# Patient Record
Sex: FEMALE | Race: WHITE | NOT HISPANIC OR LATINO | URBAN - METROPOLITAN AREA
[De-identification: names, ages, dates, MRNs, and addresses within clinical notes are randomized per-mention and may not be internally consistent; named-entity substitution may affect disease eponyms.]

---

## 2022-06-29 ENCOUNTER — APPOINTMENT (OUTPATIENT)
Dept: LAB | Facility: MEDICAL CENTER | Age: 22
End: 2022-06-29

## 2022-06-29 DIAGNOSIS — Z02.1 PRE-EMPLOYMENT HEALTH SCREENING EXAMINATION: ICD-10-CM

## 2022-06-29 LAB — RUBV IGG SERPL IA-ACNC: 9.1 IU/ML

## 2022-06-29 PROCEDURE — 86480 TB TEST CELL IMMUN MEASURE: CPT

## 2022-06-29 PROCEDURE — 36415 COLL VENOUS BLD VENIPUNCTURE: CPT

## 2022-06-29 PROCEDURE — 86787 VARICELLA-ZOSTER ANTIBODY: CPT

## 2022-06-29 PROCEDURE — 86762 RUBELLA ANTIBODY: CPT

## 2022-06-29 PROCEDURE — 86735 MUMPS ANTIBODY: CPT

## 2022-06-29 PROCEDURE — 86765 RUBEOLA ANTIBODY: CPT

## 2022-06-30 LAB
GAMMA INTERFERON BACKGROUND BLD IA-ACNC: 0.04 IU/ML
M TB IFN-G BLD-IMP: NEGATIVE
M TB IFN-G CD4+ BCKGRND COR BLD-ACNC: -0.01 IU/ML
M TB IFN-G CD4+ BCKGRND COR BLD-ACNC: -0.02 IU/ML
MEV IGG SER QL IA: NORMAL
MITOGEN IGNF BCKGRD COR BLD-ACNC: 6.81 IU/ML
MUV IGG SER QL IA: NORMAL
VZV IGG SER QL IA: ABNORMAL

## 2023-06-14 ENCOUNTER — OFFICE VISIT (OUTPATIENT)
Dept: FAMILY MEDICINE CLINIC | Facility: CLINIC | Age: 23
End: 2023-06-14
Payer: COMMERCIAL

## 2023-06-14 VITALS
HEART RATE: 83 BPM | HEIGHT: 64 IN | DIASTOLIC BLOOD PRESSURE: 68 MMHG | SYSTOLIC BLOOD PRESSURE: 110 MMHG | WEIGHT: 150 LBS | TEMPERATURE: 98 F | BODY MASS INDEX: 25.61 KG/M2 | OXYGEN SATURATION: 99 %

## 2023-06-14 DIAGNOSIS — Z12.4 SCREENING FOR CERVICAL CANCER: ICD-10-CM

## 2023-06-14 DIAGNOSIS — F41.9 ANXIETY: ICD-10-CM

## 2023-06-14 DIAGNOSIS — Z11.59 NEED FOR HEPATITIS C SCREENING TEST: ICD-10-CM

## 2023-06-14 DIAGNOSIS — Z11.4 SCREENING FOR HIV (HUMAN IMMUNODEFICIENCY VIRUS): ICD-10-CM

## 2023-06-14 DIAGNOSIS — Z11.3 SCREENING FOR STDS (SEXUALLY TRANSMITTED DISEASES): ICD-10-CM

## 2023-06-14 DIAGNOSIS — Z30.41 ENCOUNTER FOR SURVEILLANCE OF CONTRACEPTIVE PILLS: ICD-10-CM

## 2023-06-14 DIAGNOSIS — Z23 ENCOUNTER FOR IMMUNIZATION: ICD-10-CM

## 2023-06-14 DIAGNOSIS — Z13.6 SCREENING FOR CARDIOVASCULAR CONDITION: ICD-10-CM

## 2023-06-14 DIAGNOSIS — Z00.00 ROUTINE ADULT HEALTH MAINTENANCE: Primary | ICD-10-CM

## 2023-06-14 DIAGNOSIS — J45.909 MILD REACTIVE AIRWAYS DISEASE, UNSPECIFIED WHETHER PERSISTENT: ICD-10-CM

## 2023-06-14 PROCEDURE — 99385 PREV VISIT NEW AGE 18-39: CPT | Performed by: FAMILY MEDICINE

## 2023-06-14 RX ORDER — ALBUTEROL SULFATE 90 UG/1
2 AEROSOL, METERED RESPIRATORY (INHALATION) EVERY 6 HOURS PRN
Qty: 18 G | Refills: 0 | Status: SHIPPED | OUTPATIENT
Start: 2023-06-14 | End: 2023-06-14 | Stop reason: SDUPTHER

## 2023-06-14 RX ORDER — NORGESTIMATE AND ETHINYL ESTRADIOL 7DAYSX3 28
1 KIT ORAL DAILY
Qty: 28 TABLET | Refills: 6 | Status: SHIPPED | OUTPATIENT
Start: 2023-06-14 | End: 2023-12-11

## 2023-06-14 RX ORDER — ALBUTEROL SULFATE 90 UG/1
2 AEROSOL, METERED RESPIRATORY (INHALATION) EVERY 6 HOURS PRN
Qty: 18 G | Refills: 0 | Status: SHIPPED | OUTPATIENT
Start: 2023-06-14

## 2023-06-14 RX ORDER — NORGESTIMATE AND ETHINYL ESTRADIOL 7DAYSX3 28
KIT ORAL
COMMUNITY
Start: 2023-05-19 | End: 2023-06-14 | Stop reason: SDUPTHER

## 2023-06-14 NOTE — PROGRESS NOTES
ADULT ANNUAL [address]  GROUP    NAME: Collette Mori  AGE: 20 y o  SEX: female  : 2000     DATE: 2023     Assessment and Plan:     Problem List Items Addressed This Visit        Other    Routine adult health maintenance - Primary     Advised on healthy diet and exercise; check labs; UTD on pap per hx and has f/u with GYN; has regular dental visits; reviewed preventative measures; advised on vaping cessation         Encounter for surveillance of contraceptive pills     Refilled medication; reviewed medication use; will f/u with GYN         Relevant Medications    Tri-Sprintec 0 18/0 215/0 25 MG-35 MCG per tablet    Anxiety     Would like counseling; declines medication or need for medication at this time; EARLE for her pet helps manage symptoms         Relevant Orders    Ambulatory Referral to [facility]   Other Visit Diagnoses     Need for hepatitis C screening test        Relevant Orders    Hepatitis C Antibody    Screening for HIV (human immunodeficiency virus)        Relevant Orders    HIV 1/2 AG/AB w Reflex [facility] for 2 yr old and above    Screening for STDs (sexually transmitted diseases)        Encounter for immunization        Screening for cervical cancer        Relevant Orders    Ambulatory referral to Obstetrics / Gynecology    Mild reactive airways disease, unspecified whether persistent        Relevant Medications    albuterol (Ventolin HFA) 90 mcg/act inhaler    Screening for cardiovascular condition        Relevant Orders    Lipid Panel with Direct LDL reflex    Comprehensive metabolic panel    HEMOGLOBIN A1C W/ EAG ESTIMATION          Immunizations and preventive care screenings were discussed with patient today  Appropriate education was printed on patient's after visit summary      Counseling:  Alcohol/drug use: discussed moderation in alcohol intake, the recommendations for healthy alcohol use, and avoidance of illicit drug use  Dental Health: discussed importance of regular tooth brushing, flossing, and dental visits  · Exercise: the importance of regular exercise/physical activity was discussed  Recommend exercise 3-5 times per week for at least 30 minutes  BMI Counseling: Body mass index is 25 75 kg/m²  The BMI is above normal  Nutrition recommendations include encouraging healthy choices of fruits and vegetables, consuming healthier snacks and limiting drinks that contain sugar  Exercise recommendations include moderate physical activity 150 minutes/week and exercising 3-5 times per week  Rationale for BMI follow-up plan is due to patient being overweight or obese  Depression Screening and Follow-up Plan: Patient was screened for depression during today's encounter  They screened negative with a PHQ-2 score of 0  Return in about 1 year (around 6/14/2024) for Annual physical      Chief Complaint:     Chief Complaint   Patient presents with   • Establish Care      History of Present Illness:     Adult Annual Physical   Patient here for a comprehensive physical exam and is here to establish care  Recently graduated her masters in athletic training  Works with [employer]  The patient reports problems - would like to quit vaping  Patient states she was diagnosed in 2019 with anxiety and depression and OCD  Denies any depression currently but she states when she stops smoking she gets a lot of anxiety  She states she will vape which helps her anxiety  She states this is not an every day use and uses it maybe once on a weekend  States it helps her with quitting  Thinks it may also be due to stress that was making it hard for her to quit  She is weaning off  Does have an EARLE with a cat for her anxiety  Does not want any medication for her anxiety but would consider counseling  Feels it may be helpful to get coping mechanisms to deal with it on her own  Pt requesting a refill of her albuterol   Pt had medication due to one time having a panic attack that caused wheezing and reactive airway  Albuterol helped  Has not used it in years but would like to have on hand  Patient on OCP  Does not establish with GYN until the fall  States has been on OCP for years without SE or issues due to heavy period history  No hx of DVT/FHx of clotting disorder  Had Tdap when she was hired with [employer]  Diet and Physical Activity  · Diet/Nutrition: well balanced diet  · Exercise: 1-2 times a week on average  Depression Screening  PHQ-2/9 Depression Screening    Little interest or pleasure in doing things: 0 - not at all  Feeling down, depressed, or hopeless: 0 - not at all  PHQ-2 Score: 0  PHQ-2 Interpretation: Negative depression screen       General Health  · Sleep: sleeps well  · Hearing: normal - bilateral   · Vision: no vision problems  · Dental: regular dental visits  /GYN Health  · Patient is: premenopausal  · Sexually active with male, same partner  · Contraceptive method: oral contraceptives  · Has had PAP and was WNL     Review of Systems:     Review of Systems   Constitutional: Negative for chills and fever  Respiratory: Negative for shortness of breath  Cardiovascular: Negative for chest pain  Gastrointestinal: Negative for abdominal pain  Past Medical History:     History reviewed  No pertinent past medical history     Past Surgical History:     Past Surgical History:   Procedure Laterality Date   • TYMPANOSTOMY TUBE PLACEMENT Bilateral    • WISDOM TOOTH EXTRACTION Bilateral       Social History:     Social History     Socioeconomic History   • Marital status: Single     Spouse name: None   • Number of children: None   • Years of education: None   • Highest education level: None   Occupational History   • None   Tobacco Use   • Smoking status: Never   • Smokeless tobacco: Never   Vaping Use   • Vaping Use: Every day   • Substances: Nicotine   Substance and Sexual Activity   • "Alcohol use: Never   • Drug use: Never   • Sexual activity: Not Currently   Other Topics Concern   • None   Social History Narrative   • None     Social Determinants of Health     Financial Resource Strain: Not on file   Food Insecurity: Not on file   Transportation Needs: Not on file   Physical Activity: Not on file   Stress: Not on file   Social Connections: Not on file   Intimate Partner Violence: Not on file   Housing Stability: Not on file      Family History:     Family History   Problem Relation Age of Onset   • Coronary artery disease Father       Current Medications:     Current Outpatient Medications   Medication Sig Dispense Refill   • albuterol (Ventolin HFA) 90 mcg/act inhaler Inhale 2 puffs every 6 (six) hours as needed for wheezing 18 g 0   • Tri-Sprintec 0 18/0 215/0 25 MG-35 MCG per tablet Take 1 tablet by mouth daily 28 tablet 6     No current facility-administered medications for this visit  Allergies: Allergies   Allergen Reactions   • Penicillin G Other (See Comments)      Physical Exam:     /68 (BP Location: Left arm, Patient Position: Sitting, Cuff Size: Standard)   Pulse 83   Temp 98 °F (36 7 °C) (Tympanic)   Ht 5' 4\" (1 626 m)   Wt 68 kg (150 lb)   SpO2 99%   BMI 25 75 kg/m²     Physical Exam  Vitals reviewed  Constitutional:       General: She is not in acute distress  Appearance: Normal appearance  She is normal weight  She is not ill-appearing or toxic-appearing  HENT:      Head: Normocephalic and atraumatic  Right Ear: Tympanic membrane, ear canal and external ear normal  There is no impacted cerumen  Left Ear: Tympanic membrane, ear canal and external ear normal  There is no impacted cerumen  Nose: Nose normal       Mouth/Throat:      Mouth: Mucous membranes are moist       Pharynx: No oropharyngeal exudate or posterior oropharyngeal erythema  Eyes:      General: No scleral icterus  Left eye: No discharge        Conjunctiva/sclera: " Conjunctivae normal       Pupils: Pupils are equal, round, and reactive to light  Neck:      Thyroid: No thyroid mass, thyromegaly or thyroid tenderness  Cardiovascular:      Rate and Rhythm: Normal rate and regular rhythm  Heart sounds: Normal heart sounds  No murmur heard  Pulmonary:      Effort: Pulmonary effort is normal  No respiratory distress  Breath sounds: Normal breath sounds  No wheezing, rhonchi or rales  Abdominal:      General: Abdomen is flat  Palpations: There is no mass  Tenderness: There is no abdominal tenderness  There is no guarding  Hernia: No hernia is present  Musculoskeletal:         General: No swelling  Cervical back: Neck supple  No muscular tenderness  Lymphadenopathy:      Cervical: No cervical adenopathy  Skin:     Findings: No rash  Neurological:      Mental Status: She is alert and oriented to person, place, and time  Psychiatric:         Mood and Affect: Mood normal          Behavior: Behavior normal          Thought Content:  Thought content normal          Judgment: Judgment normal           Colon Albertina, DO  [facility]

## 2023-06-14 NOTE — ASSESSMENT & PLAN NOTE
Advised on healthy diet and exercise; check labs; UTD on pap per hx and has f/u with GYN; has regular dental visits; reviewed preventative measures; advised on vaping cessation

## 2023-06-14 NOTE — ASSESSMENT & PLAN NOTE
Would like counseling; declines medication or need for medication at this time; EARLE for her pet helps manage symptoms

## 2023-06-19 ENCOUNTER — TELEPHONE (OUTPATIENT)
Dept: PSYCHIATRY | Facility: CLINIC | Age: 23
End: 2023-06-19

## 2023-06-21 NOTE — TELEPHONE ENCOUNTER
Patient has been added to the Talk Therapy wait list with a referral     Insurance: [facility]  Insurance Type:    Commercial [x]   Medicaid []   [place] (if applicable)   Medicare []  [place] Preference: [place]  Provider Preference: pref fem prov  Virtual: Yes [x] No []

## 2023-07-05 DIAGNOSIS — Z30.41 ENCOUNTER FOR SURVEILLANCE OF CONTRACEPTIVE PILLS: ICD-10-CM

## 2023-07-05 RX ORDER — NORGESTIMATE AND ETHINYL ESTRADIOL 7DAYSX3 28
KIT ORAL
Qty: 84 TABLET | Refills: 3 | Status: SHIPPED | OUTPATIENT
Start: 2023-07-05

## 2023-08-13 PROBLEM — Z00.00 ROUTINE ADULT HEALTH MAINTENANCE: Status: RESOLVED | Noted: 2023-06-14 | Resolved: 2023-08-13

## 2023-08-16 ENCOUNTER — TELEMEDICINE (OUTPATIENT)
Dept: PSYCHIATRY | Facility: CLINIC | Age: 23
End: 2023-08-16
Payer: COMMERCIAL

## 2023-08-16 DIAGNOSIS — F39 MOOD DISORDER (HCC): Primary | ICD-10-CM

## 2023-08-16 PROCEDURE — 90791 PSYCH DIAGNOSTIC EVALUATION: CPT | Performed by: SOCIAL WORKER
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
## 2023-08-29 ENCOUNTER — TELEMEDICINE (OUTPATIENT)
Dept: PSYCHIATRY | Facility: CLINIC | Age: 23
End: 2023-08-29
Payer: COMMERCIAL

## 2023-08-29 DIAGNOSIS — F39 MOOD DISORDER (HCC): Primary | ICD-10-CM

## 2023-08-29 PROCEDURE — 90837 PSYTX W PT 60 MINUTES: CPT | Performed by: SOCIAL WORKER

## 2023-08-29 NOTE — BH TREATMENT PLAN
Outpatient [id number] Lucie Wells  2000     Date of Initial Psychotherapy Assessment: 8/16/2023   Date of Current Treatment Plan: 08/29/23  Treatment Plan Target Date: 2/29/2024  Treatment Plan Expiration Date: 2/29/2024    Diagnosis:   1. Mood disorder (HCC)            Strengths: "respect and kindness to other people, I always try to look on the positive things when they are hard, don't stay in the negative long, practice and have gratitude, self aware" good at communicating     Areas of Need: can be too kind and let others walk on me, standing up for myself, boundaries, wanting approval from others, I care too much and feel that sometimes (mother) I care too much about her and she is an acholics and when I try to speak to her about my concerns she shuts it down and denies that it is that big of a problem, and I get in the head spaces that I feel guilty     Long Term Goal 1 (in the client's own words): "I want to work on setting more healthy boundaries". "I want to not seek approval from others or feel guilt".     Stage of Change: Preparation    Target Date for completion: 2/29/2024     Anticipated therapeutic modalities: CBT, DBT, motivational interviewing, solution focused, supportive therapy     People identified to complete this goal: Franklyn Longoria and Clinician       Objective 1: (identify the means of measuring success in meeting the objective): Clinician will provide education on boundaries to Franklyn Longoria and process in open discussion what boundaries are, what boundaries she relates to as well as where she identifies what areas of her life that she may struggle with implementing boundaries      Objective 2: (identify the means of measuring success in meeting the objective): Clinician will provide education on communication styles to Bellojuwan Longoria and assist her with identifying which styles of communication that she results best to and what styles she feels she can make improvement on. Clinician will process with Carol Hummel how to utilize different styles in communication to assert boundaries and how to respond to others styles of communication     Objective 3: (identify the means of measuring success in meeting the objective): Clinician will explore feelings of guilt and assist as Carol Hummel identifies one of more triggers for this emotional occurrence. Clinician and Carol Erp will explore indepth the identified triggers, what it means to obtain approval to her and what it means for her to make her own choices irrelevant of others approval.  Carol Erp will identify times where she has stood her ground and made decisions that others did not approve of and how she managed this emotional situation      Long Term Goal 2 (in the client's own words):  "I want to have more healthy habits, sometimes I lack motivation like going to the gym or eating healthy and I tend to fall off these things and I want to be more consistent with these habits". Stage of Change: Preparation    Target Date for completion: 2/29/2024     Anticipated therapeutic modalities: CBT, DBT, motivational interviewing, solution focused, supportive therapy     People identified to complete this goal: Carol Hummel and Clinician       Objective 1: (identify the means of measuring success in meeting the objective): Carol Hummel will identify what healthy habits, routines and rituals she would like to engage in consistently. Carol Erp will identify what struggles and or interferences have prevented her in the past from following through with consistency. Clinician will explore common themes of emotions, people or thought patterns with Carol Hummel      Objective 2: (identify the means of measuring success in meeting the objective): Clinician will introduce SMART goals with Carol Hummel and work together to create a plan that has consistency as well as balance.   This will be an ongoing objective as clinician and Carol Hummel will monitor progress and any struggles that may occur. I am currently under the care of a [facility] psychiatric provider: no    My [facility] psychiatric provider is: n/a    I am currently taking psychiatric medications: No    I feel that I will be ready for discharge from mental health care when I reach the following (measurable goal/objective): "After a little while of doing these things I will not second guess these things and they will be part of my routine". "I will have balance". "I would feel good after standing up for myself and would not feel this fear or feel that I am going to be in trouble for doing it or it will negatively effect out relationship"  "I feel like I will always have this fear and I observe others do it".  "I want to feel equal and not submissive to others". For children and adults who have a legal guardian:   Has there been any change to custody orders and/or guardianship status? NA. If yes, attach updated documentation. I have created my Crisis Plan and have been offered a copy of this plan    [facility]: Diagnosis and Treatment Plan explained to [facility] acknowledges an understanding of their diagnosis. Michael Johansen agrees to this treatment plan. I have been offered a copy of this Treatment Plan. yes    Michael Johansen, 2000, actively participated in the creation of this plan during a virtual session, using the [employer] Now platform. Michael Johansen  provided verbal consent on 8/29/2023 at 1030 AM. The treatment plan was transcribed into the [facility] Record at a later time. Clinician sent this file to Michael oJhansen via My Chart  and reminded Michael Johansen to sign document in addition to verbal consent provided at stated time.

## 2023-08-29 NOTE — PSYCH
Behavioral Health Psychotherapy Progress Note    Psychotherapy Provided: Individual Psychotherapy     1. Mood disorder ([address] )            Goals addressed in session: created MTP    PHQ-9 (5)    DATA: Nicole Sainz met for session and presented at her home. Nicole Sainz met for second session and worked with Rochelle Judd in building rapport, creating MTP and crisis plan as well as screenings . Nicole Sainz has one person she works with that is her lead that she is aware of that there is a bit of a power struggle with her and that she is also aware that this creates a bit of her mental health. She is a but cup half empty and she is very negative and gets annoyed very easily. When she is in a bad mood everyone is in a bad mood. "I pulled her aside last April and told her that I did not like how she spoke to me and made me feel like I was not welcomed here". "The problem is that while things changed for a few months but now it is kind of back to the same spot but it is just every other day instead of daily". Nicole Sainz reports that when she confronted this lead the lead told her everything she was doing was great and that there were no areas of improvement that were needed to be made. Nicole Sainz reports that for the most part she is really happy leaving work and feels fulfilled. She is aware that there is a large focus on her lead "Elizabeth" and that she will text her boyfriend "Alisha Aguiar" to let him know Donato Munoz was nice to me today. I would have wanted to stand my ground a little more addressing that I did not like how she was talking to me at that moment. What prevented you from saying this or asserting yourself? "I feel like me being so easy gave her a win". "I view assertive as mean and that is what prevents me from saying things". Clinician and Nicole Sainz discussed slightly what it means to be assertive / aggressive    Fear of being in trouble, fear of effecting the relationship, I am not sure that she will be able to do this.   I feel like I were to assert myself she may take that as me having a win over her. I try to be empathetic to whatever she has going on in her personal life. I feel like I give her too much margaret. Emiliano Guerra reports that the last time that she talked with her boss in April her lead came back the following     During this session, this clinician used the following therapeutic modalities: Engagement Strategies    Substance Abuse was not addressed during this session. If the client is diagnosed with a co-occurring substance use disorder, please indicate any changes in the frequency or amount of use: n/a. Stage of change for addressing substance use diagnoses: No substance use/Not applicable    ASSESSMENT:  Judit Otoole presents with a Euthymic/ normal mood her affect is Normal range and intensity, which is congruent, with her mood and the content of the session. The client has made progress on their goals. Judit Otoole presents with a minimal risk of suicide, none risk of self-harm, and none risk of harm to others. For any risk assessment that surpasses a "low" rating, a safety plan must be developed. A safety plan was indicated: no  If yes, describe in detail n/a    PLAN: Between sessions, Judit Otoole will observe her  and identify how often she observes her being more aggressive rather than assertive in her communications. At the next session, the therapist will use Engagement Strategies and Client-centered Therapy to address treatment plan. Behavioral Health Treatment Plan and Discharge Planning: Judit Otoole is aware of and agrees to continue to work on their treatment plan. They have identified and are working toward their discharge goals.  yes    Visit start and stop times:    08/29/23  Start Time: 1000  Stop Time: 1100  Total Visit Time: 60 minutes     Virtual Regular Visit    Verification of patient location:    Patient is located at Home in the following state in which I hold an active license [place]      Assessment/Plan:    Problem List Items Addressed This Visit    None  Visit Diagnoses     Mood disorder ([address])    -  Primary          Goals addressed in session: MTP created          Reason for visit is No chief complaint on file. Encounter provider Addis Kenyon LCSW    Provider located at [address] [place] [id number] [phone]      Recent Visits  No visits were found meeting these conditions. Showing recent visits within past 7 days and meeting all other requirements  Today's Visits  Date Type Provider Dept   08/29/23 Telemedicine Addis Kenyon LCSW [facility]   Showing today's visits and meeting all other requirements  Future Appointments  No visits were found meeting these conditions. Showing future appointments within next 150 days and meeting all other requirements       The patient was identified by name and date of birth. Dylan Gay was informed that this is a telemedicine visit and that the visit is being conducted throughthe [employer] platform. She agrees to proceed. .  My office door was closed. No one else was in the room. She acknowledged consent and understanding of privacy and security of the video platform. The patient has agreed to participate and understands they can discontinue the visit at any time. Patient is aware this is a billable service. Yassine Corcoran is a 20 y.o. female  . HPI     No past medical history on file.     Past Surgical History:   Procedure Laterality Date   • TYMPANOSTOMY TUBE PLACEMENT Bilateral    • WISDOM TOOTH EXTRACTION Bilateral        Current Outpatient Medications   Medication Sig Dispense Refill   • albuterol (Ventolin HFA) 90 mcg/act inhaler Inhale 2 puffs every 6 (six) hours as needed for wheezing 18 g 0   • Tri-Sprintec 0.18/0.215/0.25 MG-35 MCG per tablet TAKE 1 TABLET BY MOUTH EVERY DAY 84 tablet 3     No current facility-administered medications for this visit. Allergies   Allergen Reactions   • Penicillin G Other (See Comments)       Review of Systems    Video Exam    There were no vitals filed for this visit.     Physical Exam     Visit Time    08/29/23  Start Time: 1000  Stop Time: 1100  Total Visit Time: 60 minutes

## 2023-08-29 NOTE — BH CRISIS PLAN
Client Name: Mitch Fuentes       Client YOB: 2000  : 2000    Treatment Team (include name and contact information):     Psychotherapist: Jacqui Gowers, [place] [facility]  [phone]    Psychiatrist: none   Release of information completed: no    " none   Release of information completed: no    Other (Specify Role): none    Release of information completed: no    Other (Specify Role): none   Release of information completed: no    Healthcare Provider  Juan Palma DO  [address] [facility] [address]  [address] [address]  [phone]    Type of Plan   * Child plans (children 15 yo and younger) must be completed and signed by the child's legal guardian   * Plans for all individuals 15 yo and above must be signed by the client. Plan Type: adolescent/adult (14 and over) Initial      My Personal Strengths are (in the client's own words):  ""respect and kindness to other people, I always try to look on the positive things when they are hard, don't stay in the negative long, practice and have gratitude, self aware" good at communicating"    The stressors and triggers that may put me at risk are:  not feeling good enough, work, feelign unwanted, guilt, unkind people, prolonged negativity     Coping skills I can use to keep myself calm and safe: Other (describe) talk with boyfriend and friends,     Coping skills/supports I can use to maintain abstinence from substance use:   Not Applicable    The people that provide me with help and support: (Include name, contact, and how they can help)   Support person #1: Hiawatha Bence - Boyfriend    * Phone number: in cell phone    * How can they help me? Surendra Garcia refers to her boyfriend as a person that helps put things in perspective and that she is able to talk with     Support person #2:Alf Lees     * Phone number: in cell phone    * How can they help me?  Surendra Garcia refers to her father as her hero, he is someone that she is very connected with     Support person #3:     * Phone number: n/a    * How can they help me? In the past, the following has helped me in times of crisis:    Being in a quiet space, Being with other people, Calling a friend, Calling a family member and Taking a walk or exercising      If it is an emergency and you need immediate help, call 9-1-1    If there is a possibility of danger to yourself or others, call the following crisis hotline resources:     Adult Crisis Numbers  Suicide Prevention Hotline - Dial 9-8-8  [facility]: [address]: [address] [facility] [address]: [address]: [address]: [address]: [address]: [address]: [phone] (daytime). [phone] (after hours, weekends, holidays)     Child/Adolescent Crisis Numbers   [facility]: [address]: [phone]   Verdie Breed: [phone]   [address] [place] [address] Street: [phone]    Please note: Some [facility] do not have a separate number for Child/Adolescent specific crisis. If your county is not listed under Child/Adolescent, please call the adult number for your county     National Talk to Text Line   All Ages - 171-297    In the event your feelings become unmanageable, and you cannot reach your support system, you will call 911 immediately or go to the nearest hospital emergency room. Geni Burns, 2000, actively participated in the creation of this plan during a virtual session, using the AmWell Now platform. Geni Burns  provided verbal consent on 8/29/2023 at 1030 AM. The crisis plan was transcribed into the Electronic Health Record at a later time.      Clinician sent this file to Geni Burns via My Chart and reminded Geni Burns to sign document in addition to verbal consent provided at stated time.

## 2023-09-01 ENCOUNTER — OFFICE VISIT (OUTPATIENT)
Dept: FAMILY MEDICINE CLINIC | Facility: CLINIC | Age: 23
End: 2023-09-01
Payer: COMMERCIAL

## 2023-09-01 VITALS
WEIGHT: 151.8 LBS | DIASTOLIC BLOOD PRESSURE: 72 MMHG | HEIGHT: 64 IN | HEART RATE: 91 BPM | TEMPERATURE: 98.1 F | BODY MASS INDEX: 25.92 KG/M2 | SYSTOLIC BLOOD PRESSURE: 114 MMHG | OXYGEN SATURATION: 99 %

## 2023-09-01 DIAGNOSIS — N92.6 IRREGULAR PERIODS/MENSTRUAL CYCLES: ICD-10-CM

## 2023-09-01 DIAGNOSIS — R21 RASH: ICD-10-CM

## 2023-09-01 DIAGNOSIS — R10.2 PELVIC PAIN: Primary | ICD-10-CM

## 2023-09-01 LAB — SL AMB POCT URINE HCG: NEGATIVE

## 2023-09-01 PROCEDURE — 99213 OFFICE O/P EST LOW 20 MIN: CPT | Performed by: FAMILY MEDICINE

## 2023-09-01 PROCEDURE — 81025 URINE PREGNANCY TEST: CPT | Performed by: FAMILY MEDICINE

## 2023-09-01 RX ORDER — KETOCONAZOLE 20 MG/ML
1 SHAMPOO TOPICAL 2 TIMES WEEKLY
Qty: 120 ML | Refills: 1 | Status: SHIPPED | OUTPATIENT
Start: 2023-09-04

## 2023-09-01 NOTE — ASSESSMENT & PLAN NOTE
Unclear etiology and resolved as period resolved; will check u/s and advised f/u with gyn; urine hcg negative

## 2023-09-01 NOTE — PROGRESS NOTES
Assessment/Plan:     1. Pelvic pain  Assessment & Plan:  Unclear etiology and resolved as period resolved; will check u/s and advised f/u with gyn; urine hcg negative    Orders:  -     POCT urine HCG    2. Irregular periods/menstrual cycles  -     US pelvis complete non OB; Future; Expected date: 09/01/2023  -     POCT urine HCG    3. Rash  Assessment & Plan:  C/w tinea versicolor; advised ketoconazole; f/u with derm if no improvement    Orders:  -     ketoconazole (NIZORAL) 2 % shampoo; Apply 1 Application topically 2 (two) times a week  -     Ambulatory Referral to Dermatology; Future        Subjective:      Patient ID: Jameel Washington is a 20 y.o. female. Patient states her period ended last week. She states her period was longer than usual lasting about 10 days. She states period was medium light after her normal length of period. Pain seemed to come and go. Pain was when she was spotting and cramps were severe. Did not miss doses. Has not been recently sexually active. Pt states she states she was having a lot of abdominal pain with this and she called off work due to discomfort. No current symptoms. For the past 2 years, has had issues with constipation. Sometimes she wont go for 1-2 days or will goes frequently. She was going multiple times a day for multiple days. NO N/V. No headaches. Pt is complaining for spots on chest. Seems to get worse in summer. Requesting to see dermatology. Not itchy. Doesn't bother her. Seems to be like white spots on body. The following portions of the patient's history were reviewed and updated as appropriate: allergies, current medications, past family history, past medical history, past social history, past surgical history, and problem list.    Review of Systems   Constitutional: Negative for chills and fever. Genitourinary: Positive for pelvic pain and vaginal bleeding. Skin: Positive for rash.          Objective:      /72 (BP Location: Left arm, Patient Position: Sitting, Cuff Size: Adult)   Pulse 91   Temp 98.1 °F (36.7 °C)   Ht 5' 4" (1.626 m)   Wt 68.9 kg (151 lb 12.8 oz)   LMP 08/16/2023 (Exact Date)   SpO2 99%   BMI 26.06 kg/m²          Physical Exam  Vitals reviewed. Constitutional:       General: She is not in acute distress. Appearance: Normal appearance. She is not ill-appearing, toxic-appearing or diaphoretic. HENT:      Head: Normocephalic and atraumatic. Eyes:      General: No scleral icterus. Right eye: No discharge. Left eye: No discharge. Conjunctiva/sclera: Conjunctivae normal.   Cardiovascular:      Rate and Rhythm: Normal rate and regular rhythm. Pulses: Normal pulses. Heart sounds: Normal heart sounds. No murmur heard. No gallop. Pulmonary:      Effort: Pulmonary effort is normal. No respiratory distress. Breath sounds: Normal breath sounds. No stridor. No wheezing, rhonchi or rales. Abdominal:      Palpations: Abdomen is soft. Tenderness: There is no abdominal tenderness. There is no guarding or rebound. Musculoskeletal:      Right lower leg: No edema. Left lower leg: No edema. Neurological:      General: No focal deficit present. Mental Status: She is alert and oriented to person, place, and time. Psychiatric:         Mood and Affect: Mood normal.         Behavior: Behavior normal.         Thought Content:  Thought content normal.         Judgment: Judgment normal.

## 2023-09-07 ENCOUNTER — TELEMEDICINE (OUTPATIENT)
Dept: PSYCHIATRY | Facility: CLINIC | Age: 23
End: 2023-09-07

## 2023-09-07 DIAGNOSIS — F39 MOOD DISORDER (HCC): Primary | ICD-10-CM

## 2023-09-07 PROCEDURE — NOSHOW: Performed by: SOCIAL WORKER

## 2023-09-07 NOTE — PSYCH
Behavioral Health Psychotherapy Progress Note    Psychotherapy Provided: Individual Psychotherapy     1. Mood disorder ([address])            No Call. No Show. No Charge    Jaquelinkori Cornell no showed 09/07/23 appointment , staff called and left message to reschedule appointment     Treatment Plan not due at this session.

## 2023-09-08 ENCOUNTER — HOSPITAL ENCOUNTER (OUTPATIENT)
Dept: RADIOLOGY | Facility: IMAGING CENTER | Age: 23
Discharge: HOME | End: 2023-09-08

## 2023-09-08 ENCOUNTER — TELEPHONE (OUTPATIENT)
Dept: PSYCHIATRY | Facility: CLINIC | Age: 23
End: 2023-09-08

## 2023-09-08 DIAGNOSIS — N92.6 IRREGULAR PERIODS/MENSTRUAL CYCLES: ICD-10-CM

## 2023-09-15 ENCOUNTER — HOSPITAL ENCOUNTER (OUTPATIENT)
Dept: RADIOLOGY | Facility: IMAGING CENTER | Age: 23
End: 2023-09-15
Payer: COMMERCIAL

## 2023-09-15 ENCOUNTER — APPOINTMENT (OUTPATIENT)
Dept: LAB | Facility: IMAGING CENTER | Age: 23
End: 2023-09-15
Payer: COMMERCIAL

## 2023-09-15 ENCOUNTER — APPOINTMENT (OUTPATIENT)
Dept: LAB | Facility: IMAGING CENTER | Age: 23
End: 2023-09-15

## 2023-09-15 DIAGNOSIS — Z11.4 SCREENING FOR HIV (HUMAN IMMUNODEFICIENCY VIRUS): ICD-10-CM

## 2023-09-15 DIAGNOSIS — Z11.59 NEED FOR HEPATITIS C SCREENING TEST: ICD-10-CM

## 2023-09-15 DIAGNOSIS — N92.6 IRREGULAR PERIODS/MENSTRUAL CYCLES: ICD-10-CM

## 2023-09-15 DIAGNOSIS — Z13.6 SCREENING FOR CARDIOVASCULAR CONDITION: ICD-10-CM

## 2023-09-15 DIAGNOSIS — Z00.8 OTHER SPECIFIED GENERAL MEDICAL EXAMINATION: ICD-10-CM

## 2023-09-15 LAB
ALBUMIN SERPL BCP-MCNC: 4.3 G/DL (ref 3.5–5)
ALP SERPL-CCNC: 53 U/L (ref 34–104)
ALT SERPL W P-5'-P-CCNC: 48 U/L (ref 7–52)
ANION GAP SERPL CALCULATED.3IONS-SCNC: 10 MMOL/L
AST SERPL W P-5'-P-CCNC: 93 U/L (ref 13–39)
BILIRUB SERPL-MCNC: 0.97 MG/DL (ref 0.2–1)
BUN SERPL-MCNC: 13 MG/DL (ref 5–25)
CALCIUM SERPL-MCNC: 9.6 MG/DL (ref 8.4–10.2)
CHLORIDE SERPL-SCNC: 98 MMOL/L (ref 96–108)
CHOLEST SERPL-MCNC: 196 MG/DL
CO2 SERPL-SCNC: 26 MMOL/L (ref 21–32)
CREAT SERPL-MCNC: 0.85 MG/DL (ref 0.6–1.3)
EST. AVERAGE GLUCOSE BLD GHB EST-MCNC: 97 MG/DL
GFR SERPL CREATININE-BSD FRML MDRD: 96 ML/MIN/1.73SQ M
GLUCOSE P FAST SERPL-MCNC: 82 MG/DL (ref 65–99)
HBA1C MFR BLD: 5 %
HDLC SERPL-MCNC: 56 MG/DL
LDLC SERPL CALC-MCNC: 129 MG/DL (ref 0–100)
POTASSIUM SERPL-SCNC: 4 MMOL/L (ref 3.5–5.3)
PROT SERPL-MCNC: 7.7 G/DL (ref 6.4–8.4)
SODIUM SERPL-SCNC: 134 MMOL/L (ref 135–147)
TRIGL SERPL-MCNC: 53 MG/DL

## 2023-09-15 PROCEDURE — 86803 HEPATITIS C AB TEST: CPT

## 2023-09-15 PROCEDURE — 87389 HIV-1 AG W/HIV-1&-2 AB AG IA: CPT

## 2023-09-15 PROCEDURE — 83036 HEMOGLOBIN GLYCOSYLATED A1C: CPT

## 2023-09-15 PROCEDURE — 80061 LIPID PANEL: CPT

## 2023-09-15 PROCEDURE — 36415 COLL VENOUS BLD VENIPUNCTURE: CPT

## 2023-09-15 PROCEDURE — 76830 TRANSVAGINAL US NON-OB: CPT

## 2023-09-15 PROCEDURE — 76856 US EXAM PELVIC COMPLETE: CPT

## 2023-09-15 PROCEDURE — 80053 COMPREHEN METABOLIC PANEL: CPT

## 2023-09-17 LAB
HCV AB SER QL: NORMAL
HIV 1+2 AB+HIV1 P24 AG SERPL QL IA: NORMAL
HIV 2 AB SERPL QL IA: NORMAL
HIV1 AB SERPL QL IA: NORMAL
HIV1 P24 AG SERPL QL IA: NORMAL

## 2023-09-20 ENCOUNTER — TELEMEDICINE (OUTPATIENT)
Dept: PSYCHIATRY | Facility: CLINIC | Age: 23
End: 2023-09-20
Payer: COMMERCIAL

## 2023-09-20 DIAGNOSIS — F39 MOOD DISORDER (HCC): Primary | ICD-10-CM

## 2023-09-20 PROCEDURE — 90834 PSYTX W PT 45 MINUTES: CPT | Performed by: SOCIAL WORKER

## 2023-09-20 NOTE — PSYCH
Behavioral Health Psychotherapy Progress Note    Psychotherapy Provided: Individual Psychotherapy     1. Mood disorder ([address] )            Goals addressed in session: Goal 1 and Goal 2     DATA: Rich Natarajan presented for session and met at her home. Rich Natarajan stated that a few things have happened in the past month.  "I approached my boss again and asked her if there was anything that I could work on and that I was doing great". "I confronted it and told her that sometimes I do not feel like I am part of the team because I am treated differently". "She said that she was sorry for the way I felt and that if there was ever a time that I needed to call her out to do so". "She said there was progress because this was one of the first times that I had a conversation that I did not get tearful". "I see this, I kept telling myself that I was not in trouble". "Anytime I tell someone what my opinion was as a child I was told that I was wrong and I was in trouble". Clinician provided education on memories as a cognitive distortions and how past experiences influence our reality today. Another thing that came up is that I was looking at new gyms this week in preporation to move to my boyfriends ([facility]) home. I began to have this cascade of thoughts that lent themself to even asking myself if I wanted to make this move and if I was ready to make this move. "I had to sit in this for an hour or more before I could get my thoughts back together". "When I was calmer I was able to put things in perspective that the move is not far, I am still close to my friends, I will be closer to [facility] and that I need to change to grow". How did you let out the emotions to get into the spot of being able to reframe your thoughts? "I felt like it was really silly to begin with and felt like I needed to keep it in and then [facility] I just let it all out and cried".   "I was able to take a minute after crying and began making pros and con list". Chay Maher reports that another thing that happened was that there was a day that she got upset and began crying. "With everything going on some days I do not feel like myself, I feel like I have lost the old me, sometimes when I come home I begin to fear that he is not happy with me and that he will leave me". "He gave me feedback and said that if he was being honest he can see that there are changes and that I am not the confident Chay Maher". "He reassured me that he was happy with me and that he is not leaving me and that he will travel this journey with me and support me". "We have a really great relationship and we give one another feedback that is constructive". "March was when I lost track of myself". "Hina Lester and I began dating in January and he said he was putting so much into the relationship and realized that he lost touch with things that he liked doing as a person and while I saw this as a great healthy approach my mind still turned it that he no longer wanted to put effort into me". "I know this is not true but I get trapped in this thought process at times that it throws me off". "He started putting time into himself and I knew this was healthy but it felt so abrupt". "I constantly began to question things about us". During this session, this clinician used the following therapeutic modalities: Engagement Strategies and Cognitive Processing Therapy    Substance Abuse was not addressed during this session. If the client is diagnosed with a co-occurring substance use disorder, please indicate any changes in the frequency or amount of use: n/a. Stage of change for addressing substance use diagnoses: No substance use/Not applicable    ASSESSMENT:  Jameel Washington presents with a Anxious mood. her affect is Normal range and intensity, which is congruent, with her mood and the content of the session. The client has made progress on their goals.   Jameel Washington presents with a minimal risk of suicide, none risk of self-harm, and none risk of harm to others. For any risk assessment that surpasses a "low" rating, a safety plan must be developed. A safety plan was indicated: no  If yes, describe in detail n/a    PLAN: Between sessions, Mcihael Johansen will track the feelings of self consciousness that occur identifying any triggers that are occurring for her over the next two weeks. At the next session, the therapist will use Engagement Strategies and Cognitive Processing Therapy to address treatment plan. Behavioral Health Treatment Plan and Discharge Planning: Michael Johansen is aware of and agrees to continue to work on their treatment plan. They have identified and are working toward their discharge goals. yes    Visit start and stop times:    09/20/23  Start Time: 1002  Stop Time: 1054  Total Visit Time: 52 minutes     Virtual Regular Visit    Verification of patient location:    Patient is located at Home in the following state in which I hold an active license [place]      Assessment/Plan:    Problem List Items Addressed This Visit    None  Visit Diagnoses     Mood disorder ([address])    -  Primary          Goals addressed in session: Goal 1 and Goal 2          Reason for visit is No chief complaint on file. Encounter provider Jannette Duran LCSW    Provider located at [address] [place] [id number] [phone]      Recent Visits  No visits were found meeting these conditions. Showing recent visits within past 7 days and meeting all other requirements  Today's Visits  Date Type Provider Dept   09/20/23 Telemedicine Jannette Duran LCSW [facility]   Showing today's visits and meeting all other requirements  Future Appointments  No visits were found meeting these conditions.   Showing future appointments within next 150 days and meeting all other requirements       The patient was identified by name and date of birth. Ana Evans was informed that this is a telemedicine visit and that the visit is being conducted throughthe [employer] platform. She agrees to proceed. .  My office door was closed. No one else was in the room. She acknowledged consent and understanding of privacy and security of the video platform. The patient has agreed to participate and understands they can discontinue the visit at any time. Patient is aware this is a billable service. Jaswant Lockett is a 20 y.o. female  . HPI     No past medical history on file. Past Surgical History:   Procedure Laterality Date   • TYMPANOSTOMY TUBE PLACEMENT Bilateral    • WISDOM TOOTH EXTRACTION Bilateral        Current Outpatient Medications   Medication Sig Dispense Refill   • albuterol (Ventolin HFA) 90 mcg/act inhaler Inhale 2 puffs every 6 (six) hours as needed for wheezing 18 g 0   • ketoconazole (NIZORAL) 2 % shampoo Apply 1 Application topically 2 (two) times a week 120 mL 1   • Tri-Sprintec 0.18/0.215/0.25 MG-35 MCG per tablet TAKE 1 TABLET BY MOUTH EVERY DAY 84 tablet 3     No current facility-administered medications for this visit. Allergies   Allergen Reactions   • Penicillin G Other (See Comments)       Review of Systems    Video Exam    There were no vitals filed for this visit.     Physical Exam     Visit Time    09/20/23  Start Time: 6312  Stop Time: 1739  Total Visit Time: 52 minutes

## 2023-09-21 DIAGNOSIS — E87.1 HYPONATREMIA: Primary | ICD-10-CM

## 2023-10-03 ENCOUNTER — ANNUAL EXAM (OUTPATIENT)
Dept: OBGYN CLINIC | Facility: CLINIC | Age: 23
End: 2023-10-03
Payer: COMMERCIAL

## 2023-10-03 VITALS
HEIGHT: 64 IN | WEIGHT: 150.2 LBS | DIASTOLIC BLOOD PRESSURE: 70 MMHG | BODY MASS INDEX: 25.64 KG/M2 | SYSTOLIC BLOOD PRESSURE: 122 MMHG

## 2023-10-03 DIAGNOSIS — Z01.419 ENCOUNTER FOR ANNUAL ROUTINE GYNECOLOGICAL EXAMINATION: Primary | ICD-10-CM

## 2023-10-03 DIAGNOSIS — Z12.4 SCREENING FOR CERVICAL CANCER: ICD-10-CM

## 2023-10-03 DIAGNOSIS — Z30.41 ENCOUNTER FOR SURVEILLANCE OF CONTRACEPTIVE PILLS: ICD-10-CM

## 2023-10-03 DIAGNOSIS — Z11.3 SCREEN FOR STD (SEXUALLY TRANSMITTED DISEASE): ICD-10-CM

## 2023-10-03 PROCEDURE — 87591 N.GONORRHOEAE DNA AMP PROB: CPT | Performed by: OBSTETRICS & GYNECOLOGY

## 2023-10-03 PROCEDURE — S0610 ANNUAL GYNECOLOGICAL EXAMINA: HCPCS | Performed by: OBSTETRICS & GYNECOLOGY

## 2023-10-03 PROCEDURE — 87491 CHLMYD TRACH DNA AMP PROBE: CPT | Performed by: OBSTETRICS & GYNECOLOGY

## 2023-10-03 PROCEDURE — G0145 SCR C/V CYTO,THINLAYER,RESCR: HCPCS | Performed by: OBSTETRICS & GYNECOLOGY

## 2023-10-03 RX ORDER — NORGESTIMATE AND ETHINYL ESTRADIOL 7DAYSX3 28
1 KIT ORAL DAILY
Qty: 84 TABLET | Refills: 3 | Status: SHIPPED | OUTPATIENT
Start: 2023-10-03

## 2023-10-03 NOTE — PROGRESS NOTES
Assessment/Plan:  Pap smear done for cytology, reflex HPV. Cultures obtained for GC, chlamydia, trichomonas. Encourage self breast examination as well as calcium supplementation. Continue Tri-Sprintec x1 year. Return to office in 1 year or as needed  No problem-specific Assessment & Plan notes found for this encounter. Diagnoses and all orders for this visit:    Encounter for annual routine gynecological examination  -     Liquid-based pap, screening    Screening for cervical cancer  -     Ambulatory referral to Obstetrics / Gynecology    Encounter for surveillance of contraceptive pills  -     Tri-Sprintec 0.18/0.215/0.25 MG-35 MCG per tablet; Take 1 tablet by mouth daily    Screen for STD (sexually transmitted disease)  -     Chlamydia/GC amplified DNA by PCR          Subjective:      Patient ID: Regino Rosenberg is a 20 y.o. female. HPI     This is a very pleasant 44-year-old female G0 presents as a new patient for GYN exam.  Last GYN exam was over 1 year ago. She states that she has been on Tri-Sprintec since eighth grade for cycle control. Her cycles are regular every 28 days lasting 5 days with no breakthrough bleeding. She denies any changes in bowel or bladder function. She is sexually active and has been in a monogamous relationship for 8 months. She does not use condoms. She states her partner was a good friend for 3 years prior. They will be moving in together early next year. She did receive the Gardasil vaccine in 2012. 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 She does have a history of anxiety depression, well controlled, she is currently seeing a therapist and doing very well. She does not need medication.     Tri-sprintec    HPV 2012    The following portions of the patient's history were reviewed and updated as appropriate: allergies, current medications, past family history, past medical history, past social history, past surgical history and problem list.    Review of Systems   Constitutional: Negative for fatigue, fever and unexpected weight change. Respiratory: Negative for cough, chest tightness, shortness of breath and wheezing. Cardiovascular: Negative. Negative for chest pain and palpitations. Gastrointestinal: Negative. Negative for abdominal distention, abdominal pain, blood in stool, constipation, diarrhea, nausea and vomiting. Genitourinary: Negative. Negative for difficulty urinating, dyspareunia, dysuria, flank pain, frequency, genital sores, hematuria, pelvic pain, urgency, vaginal bleeding, vaginal discharge and vaginal pain. Skin: Negative for rash. Objective:      /70   Ht 5' 4" (1.626 m)   Wt 68.1 kg (150 lb 3.2 oz)   LMP 09/13/2023 (Approximate)   BMI 25.78 kg/m²          Physical Exam  Constitutional:       Appearance: Normal appearance. She is well-developed. Cardiovascular:      Rate and Rhythm: Normal rate and regular rhythm. Pulmonary:      Effort: Pulmonary effort is normal.      Breath sounds: Normal breath sounds. Chest:   Breasts:     Right: No inverted nipple, mass, nipple discharge, skin change or tenderness. Left: No inverted nipple, mass, nipple discharge, skin change or tenderness. Abdominal:      General: Bowel sounds are normal. There is no distension. Palpations: Abdomen is soft. Tenderness: There is no abdominal tenderness. There is no guarding or rebound. Genitourinary:     Labia:         Right: No rash, tenderness or lesion. Left: No rash, tenderness or lesion. Vagina: Normal. No signs of injury. No vaginal discharge or tenderness. Cervix: No cervical motion tenderness, discharge, friability, lesion, erythema or cervical bleeding. Uterus: Not enlarged and not tender. Adnexa:         Right: No mass, tenderness or fullness. Left: No mass, tenderness or fullness.      Neurological:      Mental Status: She is alert and oriented to person, place, and time.    Psychiatric:         Behavior: Behavior normal.

## 2023-10-04 ENCOUNTER — TELEMEDICINE (OUTPATIENT)
Dept: PSYCHIATRY | Facility: CLINIC | Age: 23
End: 2023-10-04
Payer: COMMERCIAL

## 2023-10-04 DIAGNOSIS — F41.9 ANXIETY: Primary | ICD-10-CM

## 2023-10-04 LAB
C TRACH DNA SPEC QL NAA+PROBE: NEGATIVE
N GONORRHOEA DNA SPEC QL NAA+PROBE: NEGATIVE

## 2023-10-04 PROCEDURE — 90832 PSYTX W PT 30 MINUTES: CPT | Performed by: SOCIAL WORKER

## 2023-10-04 NOTE — PSYCH
Behavioral Health Psychotherapy Progress Note    Psychotherapy Provided: Individual Psychotherapy     1. Anxiety            Goals addressed in session: Goal 1     DATA: Raymond Arenas met for session and presented at her home. Raymond Arenas came into session and shared that she has been doing really well and that there is not a topic at hand that she needed to discuss today. Clinician processed this both as a typical and heathy expression and shared that we could resume from last session and complete a check in on addressed concerns on treatment plan as well as the progression and termination of therapy and when it would be appropriate for this. Raymond Arenas denies that there have been any concerns for her with insecurities this past two weeks. She reports that she has been consistent with attending the gym and attuning to her self care. "There have been a  Few times that I have had to miss the gym but I have also been able to not look at this as a all or nothing fail". Raymond Arenas reports that her work relationship with her boss has gone well and that there have not been any miscommunications or issues. Raymond Arenas reports that her fathers health has continued to improve. Clinician pointed out that at this moment she is building her tangible evidence to combat her worry script when this begins. Raymond Arenas reports that there has not been any conflict between her and her mother and that her mothers drinking has appeared to decline. She identified that in the past she has observed her sister walk away from her mother when she has drank more than is healthy and is combative and that her mother has dropped the argument. Raymond Arenas denies that she has used this before but that she will attempt this in the event that there is an argument between the two of them. Raymond Arenas shared that in small disagreements she has walked away from her mother and that this has done well for the two of them.     During this session, this clinician used the following therapeutic modalities: Client-centered Therapy and Supportive Psychotherapy    Substance Abuse was not addressed during this session. If the client is diagnosed with a co-occurring substance use disorder, please indicate any changes in the frequency or amount of use: n/a. Stage of change for addressing substance use diagnoses: No substance use/Not applicable    ASSESSMENT:  Micah Osborne presents with a Euthymic/ normal mood her affect is Normal range and intensity, which is congruent, with her mood and the content of the session. The client has made progress on their goals. Micah Osborne presents with a minimal risk of suicide, none risk of self-harm, and none risk of harm to others. For any risk assessment that surpasses a "low" rating, a safety plan must be developed. A safety plan was indicated: no  If yes, describe in detail n/a    PLAN: Between sessions, Micah Osborne will utilize communication skills and picking her battles and when it is time to walk away. At the next session, the therapist will use Client-centered Therapy and Supportive Psychotherapy to address treatment plan. Behavioral Health Treatment Plan and Discharge Planning: Micah Osborne is aware of and agrees to continue to work on their treatment plan. They have identified and are working toward their discharge goals. yes    Visit start and stop times:    10/04/23  Start Time: 1001  Stop Time: 1023  Total Visit Time: 22 minutes       Virtual Regular Visit    Verification of patient location:    Patient is located at Home in the following state in which I hold an active license [place]      Assessment/Plan:    Problem List Items Addressed This Visit        Other    Anxiety - Primary       Goals addressed in session: Goal 1          Reason for visit is No chief complaint on file.        Encounter provider Gregor Sharif LCSW    Provider located at [address] [place] [address] Dr Alex Jamil CAROL ANN  REINALDO [address] [place] [address] Road [address] [phone]      Recent Visits  No visits were found meeting these conditions. Showing recent visits within past 7 days and meeting all other requirements  Today's Visits  Date Type Provider Dept   10/04/23 Telemedicine Lisa Cormier LCSW [facility]   Showing today's visits and meeting all other requirements  Future Appointments  No visits were found meeting these conditions. Showing future appointments within next 150 days and meeting all other requirements       The patient was identified by name and date of birth. Ivania Dorsey was informed that this is a telemedicine visit and that the visit is being conducted throughthe [employer] platform. She agrees to proceed. .  My office door was closed. No one else was in the room. She acknowledged consent and understanding of privacy and security of the video platform. The patient has agreed to participate and understands they can discontinue the visit at any time. Patient is aware this is a billable service. Juliette Dash is a 20 y.o. female  . HPI     No past medical history on file. Past Surgical History:   Procedure Laterality Date   • TYMPANOSTOMY TUBE PLACEMENT Bilateral    • WISDOM TOOTH EXTRACTION Bilateral        Current Outpatient Medications   Medication Sig Dispense Refill   • albuterol (Ventolin HFA) 90 mcg/act inhaler Inhale 2 puffs every 6 (six) hours as needed for wheezing 18 g 0   • ketoconazole (NIZORAL) 2 % shampoo Apply 1 Application topically 2 (two) times a week 120 mL 1   • Tri-Sprintec 0.18/0.215/0.25 MG-35 MCG per tablet Take 1 tablet by mouth daily 84 tablet 3     No current facility-administered medications for this visit. Allergies   Allergen Reactions   • Penicillin G Other (See Comments)       Review of Systems    Video Exam    There were no vitals filed for this visit.     Physical Exam     Visit Time    10/04/23  Start Time: 1001  Stop Time: 1023  Total Visit Time: 22 minutes

## 2023-10-06 LAB
LAB AP GYN PRIMARY INTERPRETATION: NORMAL
Lab: NORMAL

## 2023-11-15 ENCOUNTER — TELEMEDICINE (OUTPATIENT)
Dept: PSYCHIATRY | Facility: CLINIC | Age: 23
End: 2023-11-15
Payer: COMMERCIAL

## 2023-11-15 DIAGNOSIS — F41.9 ANXIETY: Primary | ICD-10-CM

## 2023-11-15 PROCEDURE — 90834 PSYTX W PT 45 MINUTES: CPT | Performed by: SOCIAL WORKER

## 2023-11-15 NOTE — PSYCH
Behavioral Health Psychotherapy Progress Note    Psychotherapy Provided: Individual Psychotherapy     1. Anxiety            Goals addressed in session: Goal 1     DATA: Marilin Montero met for session and met at her home. "I am out of my depression mode and feel sort of like myself again". "I had to pull my boss aside again". "Before work got better it got worse". "On this occasion I stood up for myself and even went to the point of standing up for myself and going to my manage and asked to be moved to another location if one were to open up". "I told him that I had reached my limit of toxicity". 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 "My boss ended up changing me to another school in [facility]". 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 "This is good for me because even though I love Martha Davison I could foresee that the two of us living together and working together would potentially be an issue". "I made the switch last week and I have a better schedule, I have better days and less work and the same money". "This has been a really awesome switch for me". "My manager is awesome because he really validated to me that he saw me as a person that he cares about me and my personal well being". "I feel like my depression was what was exacerbating it". Clinician validated to Marilin Montero how healthy this was that she addressed this on so many occasions with her  as well as taking the initiative to address this with her higher manager. Clinician validated the effects that this work relationship was having on her mental health.       What are some of the symptoms that happen when you are beginning to get depressed? "I notice that I am crying a lot, it may be over something that happened a day or few weeks ago, fixation on thoughts and scenarios, fixation of questioning depression, more anxiety symptoms showing up, increase in obsessive and compulsive thoughts". 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 Chay Maher reports that she has obtained an apartment that is also closer to her job and that she will be moving in on December 1st.  "I made a decision on my gym and I can see that I made a big deal out of all of this and that was a result of my depression and not because this was really a big deal". 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 "I am doing everything that makes me feel like me again". "I know I was worried about moving away from my friends and my dad and what has happened is that I have put in perspective that I have been driving one hour daily in commute for work and that going home is not the big deal that I was making it into". During this session, this clinician used the following therapeutic modalities: Cognitive Processing Therapy and Supportive Psychotherapy    Substance Abuse was not addressed during this session. If the client is diagnosed with a co-occurring substance use disorder, please indicate any changes in the frequency or amount of use: n/a. Stage of change for addressing substance use diagnoses: No substance use/Not applicable    ASSESSMENT:  Jameel Washington presents with a Euthymic/ normal mood. her affect is Normal range and intensity, which is congruent, with her mood and the content of the session.  The client has made progress on their goals. Ivania Dorsey presents with a minimal risk of suicide, none risk of self-harm, and none risk of harm to others. For any risk assessment that surpasses a "low" rating, a safety plan must be developed. A safety plan was indicated: no  If yes, describe in detail n/a    PLAN: Between sessions, Ivania Dorsey will share with her boyfriend the symptoms of depression that she experiences to be able to catch her episodic depression before it occurrs. At the next session, the therapist will use Cognitive Processing Therapy and Supportive Psychotherapy to address treatment plan. Behavioral Health Treatment Plan and Discharge Planning: Ivania Dorsey is aware of and agrees to continue to work on their treatment plan. They have identified and are working toward their discharge goals. yes    Visit start and stop times:    11/15/23  Start Time: 1000  Stop Time: 1052  Total Visit Time: 52 minutes  Virtual Regular Visit    Verification of patient location:    Patient is located at Home in the following state in which I hold an active license [place]      Assessment/Plan:    Problem List Items Addressed This Visit       Anxiety - Primary       Goals addressed in session: Goal 1          Reason for visit is No chief complaint on file. Encounter provider Lisa Cormier LCSW    Provider located at [address] [place] [id number] [phone]      Recent Visits  No visits were found meeting these conditions. Showing recent visits within past 7 days and meeting all other requirements  Today's Visits  Date Type Provider Dept   11/15/23 Telemedicine Lisa Cormier LCSW [facility]   Showing today's visits and meeting all other requirements  Future Appointments  No visits were found meeting these conditions.   Showing future appointments within next 150 days and meeting all other requirements       The patient was identified by name and date of birth. Ana Evans was informed that this is a telemedicine visit and that the visit is being conducted throughthe [employer] platform. She agrees to proceed. .  My office door was closed. No one else was in the room. She acknowledged consent and understanding of privacy and security of the video platform. The patient has agreed to participate and understands they can discontinue the visit at any time. Patient is aware this is a billable service. Jaswant Lockett is a 24 y.o. female  . HPI     No past medical history on file. Past Surgical History:   Procedure Laterality Date    TYMPANOSTOMY TUBE PLACEMENT Bilateral     WISDOM TOOTH EXTRACTION Bilateral        Current Outpatient Medications   Medication Sig Dispense Refill    albuterol (Ventolin HFA) 90 mcg/act inhaler Inhale 2 puffs every 6 (six) hours as needed for wheezing 18 g 0    ketoconazole (NIZORAL) 2 % shampoo Apply 1 Application topically 2 (two) times a week 120 mL 1    Tri-Sprintec 0.18/0.215/0.25 MG-35 MCG per tablet Take 1 tablet by mouth daily 84 tablet 3     No current facility-administered medications for this visit. Allergies   Allergen Reactions    Penicillin G Other (See Comments)       Review of Systems    Video Exam    There were no vitals filed for this visit.     Physical Exam     Visit Time    11/15/23  Start Time: 1000  Stop Time: 0494  Total Visit Time: 52 minutes

## 2023-11-20 DIAGNOSIS — J45.909 MILD REACTIVE AIRWAYS DISEASE, UNSPECIFIED WHETHER PERSISTENT: ICD-10-CM

## 2023-11-20 RX ORDER — ALBUTEROL SULFATE 90 UG/1
2 AEROSOL, METERED RESPIRATORY (INHALATION) EVERY 6 HOURS PRN
Qty: 18 G | Refills: 0 | Status: SHIPPED | OUTPATIENT
Start: 2023-11-20 | End: 2023-11-21 | Stop reason: SDUPTHER

## 2023-11-21 DIAGNOSIS — J45.909 MILD REACTIVE AIRWAYS DISEASE, UNSPECIFIED WHETHER PERSISTENT: ICD-10-CM

## 2023-11-21 DIAGNOSIS — R21 RASH: ICD-10-CM

## 2023-11-21 RX ORDER — ALBUTEROL SULFATE 90 UG/1
2 AEROSOL, METERED RESPIRATORY (INHALATION) EVERY 6 HOURS PRN
Qty: 18 G | Refills: 0 | Status: SHIPPED | OUTPATIENT
Start: 2023-11-21

## 2023-11-21 RX ORDER — KETOCONAZOLE 20 MG/ML
1 SHAMPOO TOPICAL 2 TIMES WEEKLY
Qty: 120 ML | Refills: 1 | Status: SHIPPED | OUTPATIENT
Start: 2023-11-23

## 2023-12-13 ENCOUNTER — TELEMEDICINE (OUTPATIENT)
Dept: PSYCHIATRY | Facility: CLINIC | Age: 23
End: 2023-12-13
Payer: COMMERCIAL

## 2023-12-13 DIAGNOSIS — F41.9 ANXIETY: Primary | ICD-10-CM

## 2023-12-13 PROCEDURE — 90834 PSYTX W PT 45 MINUTES: CPT | Performed by: SOCIAL WORKER

## 2023-12-13 NOTE — PSYCH
Behavioral Health Psychotherapy Progress Note    Psychotherapy Provided: Individual Psychotherapy     1. Anxiety          Goals addressed in session: Goal 1     DATA: Roby Zapata met for session and presented at her home. 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 "I am out of a toxic work environment that I was in way too long and ma so grateful that this is over". "I am eating more healthy, I have better foods and more time to prepare the foods that are better for me". Roby Zapata stated that without working out and simply creating routines and eating at home and making food for herself she has lost 10 pounds. Roby Zapata identified that she has engaged in so many structural redesigns in her life at this point and she is very happy with this and grateful for all of the changes it has made. Roby Zapata reports that this has placed a better balance in her life at this point. How are things going with family, have you seen any changes in your relationships since making the move? "This has been good and there have not been any issues". How about friends, any changes in these relationships or frequency that you are seeing and talking to friends? "They have stayed about the same but if anything I am talking to them a little more because of having more access and time to the phone and being home that I can talk to all of them". Clinician processed self care and self preservation with Roby Zapata. 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   Roby Zapata shared how these relationships are what kept her at the school longer than she would have liked to have stayed. During this session, this clinician used the following therapeutic modalities: Cognitive Processing Therapy    Substance Abuse was not addressed during this session. If the client is diagnosed with a co-occurring substance use disorder, please indicate any changes in the frequency or amount of use: n/a. Stage of change for addressing substance use diagnoses: No substance use/Not applicable    ASSESSMENT:  Ashly Matthews presents with a Euthymic/ normal mood her affect is Normal range and intensity, which is congruent, with her mood and the content of the session. The client has made progress on their goals. Ashly Matthews presents with a minimal risk of suicide, none risk of self-harm, and none risk of harm to others. Amalia Dickerson appears to be managing a level of emotional stability and sustaining the changes that she has made in her life that have contributed to the emotional stability. For any risk assessment that surpasses a "low" rating, a safety plan must be developed. A safety plan was indicated: no  If yes, describe in detail n/a    PLAN: Between sessions, Ashly Matthews will continue to engage in self care, routine and structure and participation in hobbies. At the next session, the therapist will use Supportive Psychotherapy to address treatment plan. Aamlia Dickerson was in agreement to reducing the frequency of sessions from monthly to 6 weeks and at the next session reassessing her need for continued therapy and progression on her treatment plan goals. Behavioral Health Treatment Plan and Discharge Planning: Ashly Matthews is aware of and agrees to continue to work on their treatment plan. They have identified and are working toward their discharge goals.  yes    Visit start and stop times:    12/13/23  Start Time: 1000  Stop Time: 1052  Total Visit Time: 52 minutes    Virtual Regular Visit    Verification of patient location:    Patient is located at [facility] in the following state in which I hold an active license [place]      Assessment/Plan:    Problem List Items Addressed This Visit       Anxiety - Primary       Goals addressed in session: Goal 1          Reason for visit is No chief complaint on file. Encounter provider Alek Batres LCSW    Provider located at [address] [place] [id number] [phone]      Recent Visits  No visits were found meeting these conditions. Showing recent visits within past 7 days and meeting all other requirements  Today's Visits  Date Type Provider Dept   12/13/23 Telemedicine Alek Batres LCSW [facility]   Showing today's visits and meeting all other requirements  Future Appointments  No visits were found meeting these conditions. Showing future appointments within next 150 days and meeting all other requirements       The patient was identified by name and date of birth. Regino Rosenberg was informed that this is a telemedicine visit and that the visit is being conducted throughthe [employer] platform. She agrees to proceed. .  My office door was closed. No one else was in the room. She acknowledged consent and understanding of privacy and security of the video platform. The patient has agreed to participate and understands they can discontinue the visit at any time. Patient is aware this is a billable service. Héctor Max is a 24 y.o. female  . HPI     No past medical history on file.     Past Surgical History:   Procedure Laterality Date    TYMPANOSTOMY TUBE PLACEMENT Bilateral     WISDOM TOOTH EXTRACTION Bilateral        Current Outpatient Medications   Medication Sig Dispense Refill    albuterol (Ventolin HFA) 90 mcg/act inhaler Inhale 2 puffs every 6 (six) hours as needed for wheezing 18 g 0    ketoconazole (NIZORAL) 2 % shampoo Apply 1 Application topically 2 (two) times a week 120 mL 1    Tri-Sprintec 0.18/0.215/0.25 MG-35 MCG per tablet Take 1 tablet by mouth daily 84 tablet 3     No current facility-administered medications for this visit. Allergies   Allergen Reactions    Penicillin G Other (See Comments)       Review of Systems    Video Exam    There were no vitals filed for this visit.     Physical Exam     Visit Time    12/13/23  Start Time: 1000  Stop Time: 1959  Total Visit Time: 52 minutes

## 2024-01-08 DIAGNOSIS — J45.909 MILD REACTIVE AIRWAYS DISEASE, UNSPECIFIED WHETHER PERSISTENT: ICD-10-CM

## 2024-01-08 RX ORDER — ALBUTEROL SULFATE 90 UG/1
AEROSOL, METERED RESPIRATORY (INHALATION)
Qty: 18 G | Refills: 0 | Status: SHIPPED | OUTPATIENT
Start: 2024-01-08

## 2024-02-01 ENCOUNTER — TELEMEDICINE (OUTPATIENT)
Dept: PSYCHIATRY | Facility: CLINIC | Age: 24
End: 2024-02-01
Payer: COMMERCIAL

## 2024-02-01 DIAGNOSIS — F41.9 ANXIETY: Primary | ICD-10-CM

## 2024-02-01 PROCEDURE — 90834 PSYTX W PT 45 MINUTES: CPT | Performed by: SOCIAL WORKER

## 2024-02-01 NOTE — PSYCH
"Behavioral Health Psychotherapy Progress Note    Psychotherapy Provided: Individual Psychotherapy     1. Anxiety            Goals addressed in session: Goal 1     DATA: Gisel met for session and presented at her home.  Gisel shared that overall she has been doing great but that there was one incident.  \"I am not sure if it is because past boyfriends made things feel so insignificant when they weren't\".  \"Felipe has never given me a reason to think that he did not care\".  \"This was our one year anniversary\".  \"I was much more in a domenic mood because of the significance of the day\".  \"He did everything perfect that morning but at 1215 pm he said he realized the time and said he had to rush\".  \"I felt like it was no issue and I assumed I would just get moving and get out of his way and I asked him to come give me a hug and when he said to me to hurry up and come give him a hug\".  \"I immediatly began to have negative thoughts\".  \"He is annoyed, he does not want a hug, does he even like me anymore, it went this way in like 10 seconds, I told him never mind do what you are doing and I gave him a side hug and said have a good day\".  \"I then left and I was in my head reevaluating my behavior and began crying\".  \"I called him to address my behaviors and he answered with hey bug and I immediately began to think how do I deserve someone so good to me\".  \"I feel like I should know better with Felipe because he has never given me rationale to think different\".  Clinician and Gisel explored the rationale and experience of this being normal and that emotions can occur.  Gisel struggled with seeing this as a stereotypical experience and feels that she should be able to control her emotions at all times.  Clinician processed the logistics of this expectation.  Gisel is able to see that this expectation is more leaning on the unrealistic stance but that this is her challenge to continue to work on.    Gisel also processed her recent delve into " "spirituality, organized Mandaeism and the big bang theory.  Gisel reports that she has not believed in Mandaeism most of her life and that in her relationship with Felipe she is working towards opening her mind to Mandaeism and manda.      During this session, this clinician used the following therapeutic modalities: Supportive Psychotherapy    Substance Abuse was not addressed during this session. If the client is diagnosed with a co-occurring substance use disorder, please indicate any changes in the frequency or amount of use: n/a. Stage of change for addressing substance use diagnoses: No substance use/Not applicable    ASSESSMENT:  Dolores Allan presents with a Euthymic/ normal mood her affect is Normal range and intensity, which is congruent, with her mood and the content of the session. The client has made progress on their goals. Dolores Allan presents with a minimal risk of suicide, none risk of self-harm, and none risk of harm to others.    For any risk assessment that surpasses a \"low\" rating, a safety plan must be developed.    A safety plan was indicated: no  If yes, describe in detail n/a    PLAN: Between sessions, Dolores Allan will provide margaret for herself when experiencing heightened emotions. At the next session, the therapist will use Cognitive Processing Therapy and Supportive Psychotherapy to address treatment plan.    Behavioral Health Treatment Plan and Discharge Planning: Dolores Allan is aware of and agrees to continue to work on their treatment plan. They have identified and are working toward their discharge goals. yes    Visit start and stop times:    02/01/24  Start Time: 1005  Stop Time: 1057  Total Visit Time: 52 minutes    Virtual Regular Visit    Verification of patient location:    Patient is located at Home in the following state in which I hold an active license [place]      Assessment/Plan:    Problem List Items Addressed This Visit       Anxiety - Primary "       Goals addressed in session: Goal 1          Reason for visit is No chief complaint on file.       Encounter provider Jesika Cervantes LCSW    Provider located at [address] [place]  [place] [id number] [phone]      Recent Visits  No visits were found meeting these conditions.  Showing recent visits within past 7 days and meeting all other requirements  Today's Visits  Date Type Provider Dept   02/01/24 Telemedicine Jesika Cervantes LCSW  [facility]   Showing today's visits and meeting all other requirements  Future Appointments  No visits were found meeting these conditions.  Showing future appointments within next 150 days and meeting all other requirements       The patient was identified by name and date of birth. Dolores Allan was informed that this is a telemedicine visit and that the visit is being conducted throughthe [employer] platform. She agrees to proceed..  My office door was closed. No one else was in the room.  She acknowledged consent and understanding of privacy and security of the video platform. The patient has agreed to participate and understands they can discontinue the visit at any time.    Patient is aware this is a billable service.     Subjective  Dolores Allan is a 23 y.o. female  .      HPI     No past medical history on file.    Past Surgical History:   Procedure Laterality Date    TYMPANOSTOMY TUBE PLACEMENT Bilateral     WISDOM TOOTH EXTRACTION Bilateral        Current Outpatient Medications   Medication Sig Dispense Refill    ketoconazole (NIZORAL) 2 % shampoo Apply 1 Application topically 2 (two) times a week 120 mL 1    albuterol (PROVENTIL HFA,VENTOLIN HFA) 90 mcg/act inhaler INHALE 2 PUFFS EVERY 6 HOURS AS NEEDED FOR WHEEZING 18 g 0    Tri-Sprintec 0.18/0.215/0.25 MG-35 MCG per tablet Take 1 tablet by mouth daily 84 tablet 3     No current  facility-administered medications for this visit.        Allergies   Allergen Reactions    Penicillin G Other (See Comments)       Review of Systems    Video Exam    There were no vitals filed for this visit.    Physical Exam     Visit Time    02/01/24  Start Time: 1005  Stop Time: 1057  Total Visit Time: 52 minutes

## 2024-02-01 NOTE — BH TREATMENT PLAN
"Outpatient Behavioral Health Psychotherapy Treatment Plan    Dolores Allan  2000     Date of Initial Psychotherapy Assessment: 8/16/2023   Date of Current Treatment Plan: 2/1/2024  Treatment Plan Target Date: 8/1/2024  Treatment Plan Expiration Date: 8/1/2024    Diagnosis:   1. Anxiety            Strengths: \"respect and kindness to other people, I always try to look on the positive things when they are hard, don't stay in the negative long, practice and have gratitude, self aware\" good at communicating     Areas of Need: can be too kind and let others walk on me, standing up for myself, boundaries, wanting approval from others, I care too much and feel that sometimes (mother) I care too much about her and she is an acholics and when I try to speak to her about my concerns she shuts it down and denies that it is that big of a problem, and I get in the head spaces that I feel guilty     Long Term Goal 1 (in the client's own words): \"I want to work on setting more healthy boundaries\".  \"I want to not seek approval from others or feel guilt\".  Stage of Change: Maintenance   Target Date for completion: 8/1/2024   Anticipated therapeutic modalities: CBT, DBT, motivational interviewing, solution focused, supportive therapy   People identified to complete this goal: Dolores and Clinician       Objective 1: (identify the means of measuring success in meeting the objective): Clinician will provide education on boundaries to Dolores and process in open discussion what boundaries are, what boundaries she relates to as well as where she identifies what areas of her life that she may struggle with implementing boundaries COMPLETED 2/1/2024   Objective 2: (identify the means of measuring success in meeting the objective): Clinician will provide education on communication styles to Dolores and assist her with identifying which styles of communication that she results best to and what styles she feels she can make " "improvement on.  Clinician will process with Dolores how to utilize different styles in communication to assert boundaries and how to respond to others styles of communication COMPLETED 2/1/2024   Objective 3: (identify the means of measuring success in meeting the objective): Clinician will explore feelings of guilt and assist as Dolores identifies one of more triggers for this emotional occurrence.  Clinician and Dolores will explore indepth the identified triggers, what it means to obtain approval to her and what it means for her to make her own choices irrelevant of others approval.  Dolores will identify times where she has stood her ground and made decisions that others did not approve of and how she managed this emotional situation ONGOING   Objective 4: (identify the means of measuring success in meeting the objective): Dolores Sanjana will identify emotions in a relapse prevention approach on an ongoing basis that give her struggle or ones that she has handled successfully to process in sessions as a means to make changes or utilize positive reinforcement on successfully used skills. NEW      Long Term Goal 2 (in the client's own words):  \"I want to have more healthy habits, sometimes I lack motivation like going to the gym or eating healthy and I tend to fall off these things and I want to be more consistent with these habits\".    Stage of Change: Preparation  Target Date for completion: COMPLETED 2/1/2024   Anticipated therapeutic modalities: CBT, DBT, motivational interviewing, solution focused, supportive therapy   People identified to complete this goal: Dolores and Clinician       Objective 1: (identify the means of measuring success in meeting the objective): Dolores will identify what healthy habits, routines and rituals she would like to engage in consistently.  Dolores will identify what struggles and or interferences have prevented her in the past from following through with " "consistency.  Clinician will explore common themes of emotions, people or thought patterns with Dolores COMPLETED 2/1/2024      Objective 2: (identify the means of measuring success in meeting the objective): Clinician will introduce SMART goals with Dolores and work together to create a plan that has consistency as well as balance.  This will be an ongoing objective as clinician and Dolores will monitor progress and any struggles that may occur. COMPLETED 2/1/2024        I am currently under the care of a [facility] psychiatric provider: no    My [facility] psychiatric provider is: n/a    I am currently taking psychiatric medications: No    I feel that I will be ready for discharge from mental health care when I reach the following (measurable goal/objective): \"After a little while of doing these things I will not second guess these things and they will be part of my routine\".  \"I will have balance\".  \"I would feel good after standing up for myself and would not feel this fear or feel that I am going to be in trouble for doing it or it will negatively effect out relationship\"  \"I feel like I will always have this fear and I observe others do it\".  \"I want to feel equal and not submissive to others\".        For children and adults who have a legal guardian:   Has there been any change to custody orders and/or guardianship status? NA. If yes, attach updated documentation.    I have created my Crisis Plan and have been offered a copy of this plan    Behavioral Health Treatment Plan [place]: Diagnosis and Treatment Plan explained to Dolores Allan acknowledges an understanding of their diagnosis. Dolores Allan agrees to this treatment plan.    I have been offered a copy of this Treatment Plan. yes    Dolores Allan, 2000, actively participated in the review and update of this plan during a virtual session, using the Epic Embedded platform.   Dolores Allan  provided " verbal consent on 2/1/2024 at 1050 AM. The treatment plan was transcribed into the Electronic Health Record at a later time.     Clinician sent this file to Dolores Allan via My Chart and reminded Dolores Allan to sign document in addition to verbal consent provided at stated time.

## 2024-03-07 ENCOUNTER — TELEMEDICINE (OUTPATIENT)
Dept: PSYCHIATRY | Facility: CLINIC | Age: 24
End: 2024-03-07
Payer: COMMERCIAL

## 2024-03-07 DIAGNOSIS — F41.9 ANXIETY: Primary | ICD-10-CM

## 2024-03-07 PROCEDURE — 90834 PSYTX W PT 45 MINUTES: CPT | Performed by: SOCIAL WORKER

## 2024-03-07 NOTE — PSYCH
"Behavioral Health Psychotherapy Progress Note    Psychotherapy Provided: Individual Psychotherapy     1. Anxiety            Goals addressed in session: Goal 1     DATA: Gisel presented for session and met at her home.  Gisel came into session and shared that she has made a lot of changes for her life and that these have effected her in a positive way.  Gisel identified that she has alerted her sleep scheduled going to bed by 10 pm and waking up daily at 7 am, she is going to the gym Mon. - Fri.by 8 am, eating more healthy, working with a nutritionist, stopped all use of vape.  Clinician and Gisel processed healthy goals of eating and assuring that she has not set up concrete or unattainable goals or watkins for herself.  Gisel processed that she is aware of these type of goals and that at the current time she has her own internal drive for her success and her changes and that none of them feel dreadful to her, that she has her own drive and that she has her flexibility.    Gisel and clinician dicussed her relationship: \"These changes have effected us in a better way\".  \"Landon always wants the best for me and the only reason he used to nag me to go to the gym is that he would always say how he knew that this made me feel better\".  \"Now he always tells me how proud of me he is of me and will be my outer voice of encouragement\".  \"He moved in last Wednesday\".  \"When he first moved in we worked together and made things in the home set up to a place where it is very much both of our home\".      Gisel and clinician dicussed her overall emotions and thought processes: Gisel has mentioned aspects of her personality from over thinking to perfection.  \"The over thinking because I am so invested in my day to day hobbies I am not finding time to dwell in my thoughts and I am noticing that I am much more present, I am also recognizing if something is out of my control, and I also have recognized how much power I have in my decisions\".  \"My " "perfectionism is really only coming in with school, how things look in my home or my car being clean\".  \"It does not come in with my hair or makeup or any other aspects of myself\".  Clinician and Gisel processed empathy versus guilt and how to differentiate between these two emotions to choose if she needs to reframe of if the emotions is healthy.    During this session, this clinician used the following therapeutic modalities: Motivational Interviewing and Supportive Psychotherapy    Substance Abuse was not addressed during this session. If the client is diagnosed with a co-occurring substance use disorder, please indicate any changes in the frequency or amount of use: n/a. Stage of change for addressing substance use diagnoses: No substance use/Not applicable    ASSESSMENT:  Dolores Allan presents with a Euthymic/ normal mood her affect is Normal range and intensity, which is congruent, with her mood and the content of the session. The client has made progress on their goals. Dolores Allan presents with a minimal risk of suicide, none risk of self-harm, and none risk of harm to others.    For any risk assessment that surpasses a \"low\" rating, a safety plan must be developed.    A safety plan was indicated: no  If yes, describe in detail n/a    PLAN: Between sessions, Dolores Allan will apply maintenance to her changes that she has implemented. At the next session, the therapist will use Motivational Interviewing and Supportive Psychotherapy to address treatment plan.    Behavioral Health Treatment Plan and Discharge Planning: Dolores Allan is aware of and agrees to continue to work on their treatment plan. They have identified and are working toward their discharge goals. yes    Visit start and stop times:    03/07/24  Start Time: 1100  Stop Time: 1145  Total Visit Time: 45 minutes    Virtual Regular Visit    Verification of patient location:    Patient is located at Home in the following " state in which I hold an active license [place]      Assessment/Plan:    Problem List Items Addressed This Visit       Anxiety - Primary       Goals addressed in session: Goal 1          Reason for visit is No chief complaint on file.       Encounter provider Jesika Cervantes LCSW    Provider located at [facility]  [place] [address] [phone]      Recent Visits  No visits were found meeting these conditions.  Showing recent visits within past 7 days and meeting all other requirements  Today's Visits  Date Type Provider Dept   03/07/24 Telemedicine Jesika Cervantes LCSW  [facility]   Showing today's visits and meeting all other requirements  Future Appointments  No visits were found meeting these conditions.  Showing future appointments within next 150 days and meeting all other requirements       The patient was identified by name and date of birth. Dolores Allan was informed that this is a telemedicine visit and that the visit is being conducted throughthe Epic Embedded platform. She agrees to proceed..  My office door was closed. No one else was in the room.  She acknowledged consent and understanding of privacy and security of the video platform. The patient has agreed to participate and understands they can discontinue the visit at any time.    Patient is aware this is a billable service.     Subjective  Dolores Allan is a 23 y.o. female  .      HPI     No past medical history on file.    Past Surgical History:   Procedure Laterality Date    TYMPANOSTOMY TUBE PLACEMENT Bilateral     WISDOM TOOTH EXTRACTION Bilateral        Current Outpatient Medications   Medication Sig Dispense Refill    ketoconazole (NIZORAL) 2 % shampoo Apply 1 Application topically 2 (two) times a week 120 mL 1    albuterol (PROVENTIL HFA,VENTOLIN HFA) 90 mcg/act inhaler INHALE 2 PUFFS EVERY 6 HOURS AS NEEDED FOR  WHEEZING 18 g 0    Tri-Sprintec 0.18/0.215/0.25 MG-35 MCG per tablet Take 1 tablet by mouth daily 84 tablet 3     No current facility-administered medications for this visit.        Allergies   Allergen Reactions    Penicillin G Other (See Comments)       Review of Systems    Video Exam    There were no vitals filed for this visit.    Physical Exam     Visit Time    03/07/24  Start Time: 1100  Stop Time: 1145  Total Visit Time: 45 minutes

## 2024-03-26 ENCOUNTER — OFFICE VISIT (OUTPATIENT)
Dept: URGENT CARE | Age: 24
End: 2024-03-26
Payer: COMMERCIAL

## 2024-03-26 VITALS
RESPIRATION RATE: 18 BRPM | HEART RATE: 98 BPM | DIASTOLIC BLOOD PRESSURE: 68 MMHG | TEMPERATURE: 97.4 F | SYSTOLIC BLOOD PRESSURE: 106 MMHG | OXYGEN SATURATION: 98 %

## 2024-03-26 DIAGNOSIS — R10.33 PERIUMBILICAL ABDOMINAL PAIN: Primary | ICD-10-CM

## 2024-03-26 PROCEDURE — 99213 OFFICE O/P EST LOW 20 MIN: CPT

## 2024-03-26 NOTE — PROGRESS NOTES
[facility]        NAME: Dolores Allan is a 23 y.o. female  : 2000    MRN: [id number]  DATE: 2024  TIME: 7:21 PM    Assessment and Plan   Periumbilical abdominal pain [R10.33]  1. Periumbilical abdominal pain        Physical exam normal in office, no pain currently.   Please follow up with PCP within the week for further workup.   Go to ED if symptoms return.     Patient Instructions     Abdominal Pain   WHAT YOU NEED TO KNOW:   Abdominal pain can be dull, achy, or sharp. You may have pain in one area of your abdomen, or in your entire abdomen. Your pain may be caused by a condition such as constipation, food sensitivity or poisoning, infection, or a blockage. Abdominal pain can also be from a hernia, appendicitis, or an ulcer. Liver, gallbladder, or kidney conditions can also cause abdominal pain. The cause of your abdominal pain may not be known.        DISCHARGE INSTRUCTIONS:   Call your local emergency number (911 in the [place]) if:   You have chest pain or shortness of breath.        Return to the emergency department if:   You have pulsing pain in your upper abdomen or lower back that suddenly becomes constant.     Your pain is in the right lower abdominal area and worsens with movement.     You have a fever over 100.4°F (38°C) or shaking chills.     You are vomiting and cannot keep food or liquids down.     Your pain does not improve or gets worse over the next 8 to 12 hours.     You see blood in your vomit or bowel movements, or they look black and tarry.     Your skin or the whites of your eyes turn yellow.     You are a woman and have a large amount of vaginal bleeding that is not your monthly period.     Call your doctor if:   You have pain in your lower back.     You are a man and have pain in your testicles.     You have pain when you urinate.     You have questions or concerns about your condition or care.     Medicines:  You may need any of the following:  Medicines  may  be given to calm your stomach or prevent vomiting.     Prescription pain medicine  may be given. Ask your healthcare provider how to take this medicine safely. Some prescription pain medicines contain acetaminophen. Do not take other medicines that contain acetaminophen without talking to your healthcare provider. Too much acetaminophen may cause liver damage. Prescription pain medicine may cause constipation. Ask your healthcare provider how to prevent or treat constipation.      Take your medicine as directed.  Contact your healthcare provider if you think your medicine is not helping or if you have side effects. Tell your provider if you are allergic to any medicine. Keep a list of the medicines, vitamins, and herbs you take. Include the amounts, and when and why you take them. Bring the list or the pill bottles to follow-up visits. Carry your medicine list with you in case of an emergency.     Manage or prevent abdominal pain:   Apply heat  on your abdomen for 20 to 30 minutes every 2 hours for as many days as directed. Heat helps decrease pain and muscle spasms.     Make changes to the foods you eat, if needed.  Do not eat foods that cause abdominal pain or other symptoms. Eat small meals more often. The following changes may also help:     Eat more high-fiber foods if you are constipated.  High-fiber foods include fruits, vegetables, whole-grain foods, and legumes such as peterson beans.          Do not eat foods that cause gas if you have bloating.  Examples include broccoli, cabbage, beans, and carbonated drinks.     Do not eat foods or drinks that contain sorbitol or fructose if you have diarrhea and bloating.  Some examples are fruit juices, candy, jelly, and sugar-free gum.     Do not eat high-fat foods.  Examples include fried foods, cheeseburgers, hot dogs, and desserts.     Make changes to the liquids you drink, if needed.  Do not drink liquids that cause pain or make it worse, such as orange juice. Drink  liquids throughout the day to stay hydrated. The following changes may also help:     Drink more liquids to prevent dehydration from diarrhea or vomiting.  Ask your healthcare provider how much liquid to drink each day and which liquids are best for you.     Limit or do not have caffeine.  Caffeine may make symptoms such as heartburn or nausea worse.     Limit or do not drink alcohol.  Alcohol can make your abdominal pain worse. Ask your healthcare provider if it is okay for you to drink alcohol. Also ask how much is okay for you to drink. A drink of alcohol is 12 ounces of beer, ½ ounce of liquor, or 5 ounces of wine.     Keep a diary of your abdominal pain.  A diary may help your healthcare provider learn what is causing your pain. Include when the pain happens, how long it lasts, and what the pain feels like. Write down any other symptoms you have with abdominal pain. Also write down what you eat, and any symptoms you have after you eat.     Manage stress.  Stress may cause abdominal pain. Your healthcare provider may recommend relaxation techniques and deep breathing exercises to help decrease your stress. Your healthcare provider may recommend you talk to someone about your stress or anxiety, such as a counselor or a friend. Get plenty of sleep. Exercise regularly.          Do not smoke.  Nicotine and other chemicals in cigarettes can damage your esophagus and stomach. Ask your healthcare provider for information if you currently smoke and need help to quit. E-cigarettes or smokeless tobacco still contain nicotine. Talk to your healthcare provider before you use these products.     Follow up with your doctor as directed:  Write down your questions so you remember to ask them during your visits.  © Copyright [employer] 2023 Information is for End User's use only and may not be sold, redistributed or otherwise used for commercial purposes.  The above information is an  only. It is not intended as  "medical advice for individual conditions or treatments. Talk to your doctor, nurse or pharmacist before following any medical regimen to see if it is safe and effective for you.          Follow up with PCP in 3-5 days.  Proceed to  ER if symptoms worsen.    Chief Complaint     Chief Complaint   Patient presents with    Abdominal Pain     Patient states that she started to have abdominal pain with waves of pain on Friday. She states that today she had severe pain without relief. She notes that the pain is the middle to upper abdominal area. She had slight heartburn starting today. She has no change in BM. She started her period today. Denies V/D. She started to have nausea today.          History of Present Illness       Abdominal Pain  This is a new problem. The current episode started in the past 7 days (x 3 days). The onset quality is sudden. The problem occurs intermittently. The problem has been unchanged. The pain is located in the periumbilical region. The quality of the pain is cramping (patient reports that the pain feels as though her muscle is \"clenching\"). The abdominal pain does not radiate. Associated symptoms include nausea. Pertinent negatives include no anorexia, arthralgias, belching, constipation, diarrhea, dysuria, fever, flatus, frequency, headaches, hematochezia, hematuria, melena, myalgias, vomiting or weight loss. Nothing aggravates the pain. The pain is relieved by Nothing. She has tried nothing for the symptoms. The treatment provided no relief. There is no history of abdominal surgery, colon cancer, Crohn's disease, gallstones, GERD, irritable bowel syndrome, pancreatitis, PUD or ulcerative colitis.       Review of Systems   Review of Systems   Constitutional:  Negative for fatigue, fever and weight loss.   HENT:  Negative for congestion, ear discharge, ear pain, postnasal drip, rhinorrhea, sinus pressure, sinus pain, sneezing and sore throat.    Eyes: Negative.  Negative for pain, " discharge, redness and itching.   Respiratory: Negative.  Negative for apnea, cough, choking, chest tightness, shortness of breath and stridor.    Cardiovascular: Negative.  Negative for chest pain and palpitations.   Gastrointestinal:  Positive for abdominal pain and nausea. Negative for abdominal distention, anal bleeding, anorexia, blood in stool, constipation, diarrhea, flatus, hematochezia, melena, rectal pain and vomiting.        Nausea intermittent, associated only with pain, resolves afterward.    Endocrine: Negative.  Negative for polydipsia, polyphagia and polyuria.   Genitourinary: Negative.  Negative for decreased urine volume, dysuria, flank pain, frequency and hematuria.   Musculoskeletal: Negative.  Negative for arthralgias, back pain, myalgias, neck pain and neck stiffness.   Skin: Negative.  Negative for color change and rash.   Allergic/Immunologic: Negative.  Negative for environmental allergies.   Neurological: Negative.  Negative for dizziness, facial asymmetry, light-headedness, numbness and headaches.   Hematological: Negative.  Negative for adenopathy.   Psychiatric/Behavioral: Negative.           Current Medications       Current Outpatient Medications:     albuterol (PROVENTIL HFA,VENTOLIN HFA) 90 mcg/act inhaler, INHALE 2 PUFFS EVERY 6 HOURS AS NEEDED FOR WHEEZING, Disp: 18 g, Rfl: 0    ketoconazole (NIZORAL) 2 % shampoo, Apply 1 Application topically 2 (two) times a week, Disp: 120 mL, Rfl: 1    Tri-Sprintec 0.18/0.215/0.25 MG-35 MCG per tablet, Take 1 tablet by mouth daily, Disp: 84 tablet, Rfl: 3    Current Allergies     Allergies as of 03/26/2024 - Reviewed 03/26/2024   Allergen Reaction Noted    Penicillin g Other (See Comments) 01/28/2021            The following portions of the patient's history were reviewed and updated as appropriate: allergies, current medications, past family history, past medical history, past social history, past surgical history and problem list.     No past  medical history on file.    Past Surgical History:   Procedure Laterality Date    TYMPANOSTOMY TUBE PLACEMENT Bilateral     WISDOM TOOTH EXTRACTION Bilateral        Family History   Problem Relation Age of Onset    Coronary artery disease Father          Medications have been verified.        Objective   /68   Pulse 98   Temp (!) 97.4 °F (36.3 °C)   Resp 18   SpO2 98%        Physical Exam     Physical Exam  Vitals and nursing note reviewed.   Constitutional:       General: She is not in acute distress.     Appearance: Normal appearance. She is not ill-appearing, toxic-appearing or diaphoretic.      Interventions: She is not intubated.  HENT:      Head: Normocephalic and atraumatic.      Right Ear: Tympanic membrane normal.      Left Ear: Tympanic membrane normal.      Nose: Nose normal. No congestion or rhinorrhea.      Mouth/Throat:      Mouth: Mucous membranes are moist.   Eyes:      Extraocular Movements: Extraocular movements intact.      Conjunctiva/sclera: Conjunctivae normal.      Pupils: Pupils are equal, round, and reactive to light.   Cardiovascular:      Rate and Rhythm: Normal rate and regular rhythm.      Pulses: Normal pulses.      Heart sounds: Normal heart sounds, S1 normal and S2 normal. Heart sounds not distant. No murmur heard.     No friction rub. No gallop.   Pulmonary:      Effort: Pulmonary effort is normal. No tachypnea, bradypnea, accessory muscle usage, prolonged expiration, respiratory distress or retractions. She is not intubated.      Breath sounds: Normal breath sounds. No stridor, decreased air movement or transmitted upper airway sounds. No decreased breath sounds, wheezing, rhonchi or rales.   Abdominal:      General: Abdomen is flat. Bowel sounds are normal.      Palpations: Abdomen is soft.      Tenderness: There is no abdominal tenderness. There is no right CVA tenderness, left CVA tenderness, guarding or rebound.      Comments: No reproducible TTP of abdomen on exam.     Musculoskeletal:         General: Normal range of motion.      Cervical back: Normal range of motion and neck supple. No tenderness.   Skin:     General: Skin is warm and dry.      Capillary Refill: Capillary refill takes less than 2 seconds.   Neurological:      General: No focal deficit present.      Mental Status: She is alert and oriented to person, place, and time.      Cranial Nerves: No cranial nerve deficit.   Psychiatric:         Mood and Affect: Mood normal.         Behavior: Behavior normal.

## 2024-03-26 NOTE — LETTER
March 26, 2024     Patient: Dolores Allan   YOB: 2000   Date of Visit: 3/26/2024       To Whom it May Concern:    Dolores Allan was seen in my clinic on 3/26/2024. She may return on 03/27/2024.     If you have any questions or concerns, please don't hesitate to call.         Sincerely,          KYLE Chappell        CC: No Recipients

## 2024-03-26 NOTE — PATIENT INSTRUCTIONS
Physical exam normal in office, no pain currently.   Please follow up with PCP within the week for further workup.   Go to ED if symptoms return.

## 2024-03-29 ENCOUNTER — HOSPITAL ENCOUNTER (EMERGENCY)
Facility: HOSPITAL | Age: 24
Discharge: HOME | End: 2024-03-29
Attending: EMERGENCY MEDICINE
Payer: COMMERCIAL

## 2024-03-29 ENCOUNTER — APPOINTMENT (EMERGENCY)
Dept: RADIOLOGY | Facility: HOSPITAL | Age: 24
End: 2024-03-29
Payer: COMMERCIAL

## 2024-03-29 VITALS
RESPIRATION RATE: 16 BRPM | HEART RATE: 60 BPM | TEMPERATURE: 98.1 F | OXYGEN SATURATION: 100 % | SYSTOLIC BLOOD PRESSURE: 114 MMHG | DIASTOLIC BLOOD PRESSURE: 77 MMHG

## 2024-03-29 DIAGNOSIS — K59.00 CONSTIPATION: Primary | ICD-10-CM

## 2024-03-29 DIAGNOSIS — R10.9 ABDOMINAL PAIN: ICD-10-CM

## 2024-03-29 LAB
ALBUMIN SERPL BCP-MCNC: 4.1 G/DL (ref 3.5–5)
ALP SERPL-CCNC: 53 U/L (ref 34–104)
ALT SERPL W P-5'-P-CCNC: 20 U/L (ref 7–52)
ANION GAP SERPL CALCULATED.3IONS-SCNC: 6 MMOL/L (ref 4–13)
AST SERPL W P-5'-P-CCNC: 20 U/L (ref 13–39)
ATRIAL RATE: 62 BPM
BASOPHILS # BLD AUTO: 0.05 THOUSANDS/ÂΜL (ref 0–0.1)
BASOPHILS NFR BLD AUTO: 1 % (ref 0–1)
BILIRUB SERPL-MCNC: 0.58 MG/DL (ref 0.2–1)
BILIRUB UR QL STRIP: NEGATIVE
BUN SERPL-MCNC: 14 MG/DL (ref 5–25)
CALCIUM SERPL-MCNC: 9.3 MG/DL (ref 8.4–10.2)
CHLORIDE SERPL-SCNC: 103 MMOL/L (ref 96–108)
CLARITY UR: CLEAR
CO2 SERPL-SCNC: 27 MMOL/L (ref 21–32)
COLOR UR: NORMAL
CREAT SERPL-MCNC: 0.67 MG/DL (ref 0.6–1.3)
EOSINOPHIL # BLD AUTO: 0.86 THOUSAND/ÂΜL (ref 0–0.61)
EOSINOPHIL NFR BLD AUTO: 15 % (ref 0–6)
ERYTHROCYTE [DISTWIDTH] IN BLOOD BY AUTOMATED COUNT: 12.7 % (ref 11.6–15.1)
EXT PREGNANCY TEST URINE: NEGATIVE
EXT. CONTROL: NORMAL
GFR SERPL CREATININE-BSD FRML MDRD: 123 ML/MIN/1.73SQ M
GLUCOSE SERPL-MCNC: 83 MG/DL (ref 65–140)
GLUCOSE UR STRIP-MCNC: NEGATIVE MG/DL
HCT VFR BLD AUTO: 39.9 % (ref 34.8–46.1)
HGB BLD-MCNC: 13.2 G/DL (ref 11.5–15.4)
HGB UR QL STRIP.AUTO: NEGATIVE
IMM GRANULOCYTES # BLD AUTO: 0.01 THOUSAND/UL (ref 0–0.2)
IMM GRANULOCYTES NFR BLD AUTO: 0 % (ref 0–2)
KETONES UR STRIP-MCNC: NEGATIVE MG/DL
LEUKOCYTE ESTERASE UR QL STRIP: NEGATIVE
LIPASE SERPL-CCNC: 19 U/L (ref 11–82)
LYMPHOCYTES # BLD AUTO: 1.72 THOUSANDS/ÂΜL (ref 0.6–4.47)
LYMPHOCYTES NFR BLD AUTO: 30 % (ref 14–44)
MCH RBC QN AUTO: 29.6 PG (ref 26.8–34.3)
MCHC RBC AUTO-ENTMCNC: 33.1 G/DL (ref 31.4–37.4)
MCV RBC AUTO: 90 FL (ref 82–98)
MONOCYTES # BLD AUTO: 0.47 THOUSAND/ÂΜL (ref 0.17–1.22)
MONOCYTES NFR BLD AUTO: 8 % (ref 4–12)
NEUTROPHILS # BLD AUTO: 2.6 THOUSANDS/ÂΜL (ref 1.85–7.62)
NEUTS SEG NFR BLD AUTO: 46 % (ref 43–75)
NITRITE UR QL STRIP: NEGATIVE
NRBC BLD AUTO-RTO: 0 /100 WBCS
P AXIS: 33 DEGREES
PH UR STRIP.AUTO: 6.5 [PH]
PLATELET # BLD AUTO: 263 THOUSANDS/UL (ref 149–390)
PMV BLD AUTO: 10.5 FL (ref 8.9–12.7)
POTASSIUM SERPL-SCNC: 4.3 MMOL/L (ref 3.5–5.3)
PR INTERVAL: 172 MS
PROT SERPL-MCNC: 7.6 G/DL (ref 6.4–8.4)
PROT UR STRIP-MCNC: NEGATIVE MG/DL
QRS AXIS: 96 DEGREES
QRSD INTERVAL: 76 MS
QT INTERVAL: 398 MS
QTC INTERVAL: 403 MS
RBC # BLD AUTO: 4.46 MILLION/UL (ref 3.81–5.12)
SODIUM SERPL-SCNC: 136 MMOL/L (ref 135–147)
SP GR UR STRIP.AUTO: 1.02 (ref 1–1.03)
T WAVE AXIS: 14 DEGREES
UROBILINOGEN UR STRIP-ACNC: <2 MG/DL
VENTRICULAR RATE: 62 BPM
WBC # BLD AUTO: 5.71 THOUSAND/UL (ref 4.31–10.16)

## 2024-03-29 PROCEDURE — 74177 CT ABD & PELVIS W/CONTRAST: CPT

## 2024-03-29 PROCEDURE — 93005 ELECTROCARDIOGRAM TRACING: CPT

## 2024-03-29 PROCEDURE — 99284 EMERGENCY DEPT VISIT MOD MDM: CPT

## 2024-03-29 PROCEDURE — 93010 ELECTROCARDIOGRAM REPORT: CPT | Performed by: INTERNAL MEDICINE

## 2024-03-29 PROCEDURE — 81025 URINE PREGNANCY TEST: CPT

## 2024-03-29 PROCEDURE — 81003 URINALYSIS AUTO W/O SCOPE: CPT

## 2024-03-29 PROCEDURE — 85025 COMPLETE CBC W/AUTO DIFF WBC: CPT

## 2024-03-29 PROCEDURE — 83690 ASSAY OF LIPASE: CPT

## 2024-03-29 PROCEDURE — 99285 EMERGENCY DEPT VISIT HI MDM: CPT | Performed by: EMERGENCY MEDICINE

## 2024-03-29 PROCEDURE — 36415 COLL VENOUS BLD VENIPUNCTURE: CPT

## 2024-03-29 PROCEDURE — 80053 COMPREHEN METABOLIC PANEL: CPT

## 2024-03-29 RX ORDER — FAMOTIDINE 20 MG/1
20 TABLET, FILM COATED ORAL 2 TIMES DAILY
Qty: 60 TABLET | Refills: 0 | Status: SHIPPED | OUTPATIENT
Start: 2024-03-29 | End: 2024-04-28

## 2024-03-29 RX ORDER — POLYETHYLENE GLYCOL 3350 17 G/17G
17 POWDER, FOR SOLUTION ORAL DAILY
Qty: 510 G | Refills: 0 | Status: SHIPPED | OUTPATIENT
Start: 2024-03-29

## 2024-03-29 RX ADMIN — IOHEXOL 100 ML: 350 INJECTION, SOLUTION INTRAVENOUS at 14:36

## 2024-03-29 NOTE — DISCHARGE INSTRUCTIONS
You were seen in the ED for abdominal pain. Your workup was not concerning for anything dangerous at this time.     Your CT showed a large stool burden with concerns for dysmotility. We sent a prescription for miralax to your pharmacy. You can take this once a day to encourage bowel movements. I also prescribed Pepcid to help with the pain.     Please follow up with GI for further evaluation of pain.    Please return to ED if you have worsening pain, nausea/vomiting, fever or other concerning symptoms.

## 2024-03-29 NOTE — ED PROVIDER NOTES
History  Chief Complaint   Patient presents with    Abdominal Pain     Upper central abdominal pain for about 1 week. Comes in waves and with nausea. Bowel movements normal. No fever at home.     HPI  Patient is 23-year-old female with no past medical history presenting to ED for abdominal pain.  Patient describes pain as intermittent and lasts 10 sharp pain which gradually subsides.  Patient also reports nausea.  Patient is not aware of any type relation to food.  LMP 3/25.  Patient seen in urgent care on 3/26 and told to come to ED if pain became worse.  Patient reports today increasing intensity of pain.  Patient denies fever, chills, chest pain, shortness of breath, urinary complaints.      Prior to Admission Medications   Prescriptions Last Dose Informant Patient Reported? Taking?   Tri-Sprintec 0.18/0.215/0.25 MG-35 MCG per tablet   No No   Sig: Take 1 tablet by mouth daily   albuterol (PROVENTIL HFA,VENTOLIN HFA) 90 mcg/act inhaler   No No   Sig: INHALE 2 PUFFS EVERY 6 HOURS AS NEEDED FOR WHEEZING   ketoconazole (NIZORAL) 2 % shampoo   No No   Sig: Apply 1 Application topically 2 (two) times a week      Facility-Administered Medications: None       History reviewed. No pertinent past medical history.    Past Surgical History:   Procedure Laterality Date    TYMPANOSTOMY TUBE PLACEMENT Bilateral     WISDOM TOOTH EXTRACTION Bilateral        Family History   Problem Relation Age of Onset    Coronary artery disease Father      I have reviewed and agree with the history as documented.    E-Cigarette/Vaping    E-Cigarette Use Current Every Day User     Comments Vaping      E-Cigarette/Vaping Substances    Nicotine Yes     THC No     CBD No     Flavoring No     Other No     Unknown No      Social History     Tobacco Use    Smoking status: Never    Smokeless tobacco: Never   Vaping Use    Vaping status: Every Day    Substances: Nicotine   Substance Use Topics    Alcohol use: Never    Drug use: Never        Review of  Systems   Constitutional:  Negative for chills and fever.   HENT:  Negative for ear pain and sore throat.    Respiratory:  Negative for cough and shortness of breath.    Cardiovascular:  Negative for chest pain, palpitations and leg swelling.   Gastrointestinal:  Positive for abdominal pain and nausea. Negative for diarrhea and vomiting.   Genitourinary:  Negative for dysuria, frequency and hematuria.   Musculoskeletal:  Negative for back pain and neck pain.   Skin:  Negative for rash.   Neurological:  Negative for dizziness, light-headedness and headaches.       Physical Exam  ED Triage Vitals [03/29/24 1231]   Temperature Pulse Respirations Blood Pressure SpO2   98.1 °F (36.7 °C) 73 16 135/81 100 %      Temp Source Heart Rate Source Patient Position - Orthostatic VS BP Location FiO2 (%)   Oral Monitor Sitting Right arm --      Pain Score       No Pain             Orthostatic Vital Signs  Vitals:    03/29/24 1231 03/29/24 1345 03/29/24 1415 03/29/24 1532   BP: 135/81   114/77   Pulse: 73 64 68 60   Patient Position - Orthostatic VS: Sitting          Physical Exam  Vitals reviewed.   Constitutional:       General: She is awake.   HENT:      Head: Normocephalic and atraumatic.      Mouth/Throat:      Mouth: Mucous membranes are moist.   Eyes:      Extraocular Movements: Extraocular movements intact.      Right eye: No nystagmus.      Left eye: No nystagmus.      Conjunctiva/sclera: Conjunctivae normal.      Pupils: Pupils are equal, round, and reactive to light.   Cardiovascular:      Rate and Rhythm: Normal rate and regular rhythm.      Pulses: Normal pulses.      Heart sounds: Normal heart sounds, S1 normal and S2 normal. Heart sounds not distant. No murmur heard.     No friction rub. No gallop.   Pulmonary:      Breath sounds: No stridor. No wheezing, rhonchi or rales.      Comments: CTA b/l   Abdominal:      General: Bowel sounds are normal.      Palpations: Abdomen is soft.      Tenderness: There is abdominal  tenderness in the periumbilical area. There is rebound. There is no guarding.   Musculoskeletal:      Right lower leg: No edema.      Left lower leg: No edema.   Skin:     General: Skin is warm and dry.      Capillary Refill: Capillary refill takes less than 2 seconds.   Neurological:      Mental Status: She is alert and oriented to person, place, and time.      GCS: GCS eye subscore is 4. GCS verbal subscore is 5. GCS motor subscore is 6.      Motor: No pronator drift.         ED Medications  Medications   iohexol (OMNIPAQUE) 350 MG/ML injection (MULTI-DOSE) 100 mL (100 mL Intravenous Given 3/29/24 1436)       Diagnostic Studies  Results Reviewed       Procedure Component Value Units Date/Time    Comprehensive metabolic panel [[id number]] Collected: 03/29/24 1320    Lab Status: Final result Specimen: Blood from Arm, Right Updated: 03/29/24 1350     Sodium 136 mmol/L      Potassium 4.3 mmol/L      Chloride 103 mmol/L      CO2 27 mmol/L      ANION GAP 6 mmol/L      BUN 14 mg/dL      Creatinine 0.67 mg/dL      Glucose 83 mg/dL      Calcium 9.3 mg/dL      AST 20 U/L      ALT 20 U/L      Alkaline Phosphatase 53 U/L      Total Protein 7.6 g/dL      Albumin 4.1 g/dL      Total Bilirubin 0.58 mg/dL      eGFR 123 ml/min/1.73sq m     Narrative:      National Kid[employer] Foundation guidelines for Chronic Kidney Disease (CKD):     Stage 1 with normal or high GFR (GFR > 90 mL/min/1.73 square meters)    Stage 2 Mild CKD (GFR = 60-89 mL/min/1.73 square meters)    Stage 3A Moderate CKD (GFR = 45-59 mL/min/1.73 square meters)    Stage 3B Moderate CKD (GFR = 30-44 mL/min/1.73 square meters)    Stage 4 Severe CKD (GFR = 15-29 mL/min/1.73 square meters)    Stage 5 End Stage CKD (GFR <15 mL/min/1.73 square meters)  Note: GFR calculation is accurate only with a steady state creatinine    Lipase [[id number]]  (Normal) Collected: 03/29/24 1320    Lab Status: Final result Specimen: Blood from Arm, Right Updated: 03/29/24 1350     Lipase  19 u/L     UA w Reflex to Microscopic w Reflex to Culture [[id number]] Collected: 03/29/24 1320    Lab Status: Final result Specimen: Urine, Clean Catch Updated: 03/29/24 1337     Color, UA Light Yellow     Clarity, UA Clear     Specific Gravity, UA 1.017     pH, UA 6.5     Leukocytes, UA Negative     Nitrite, UA Negative     Protein, UA Negative mg/dl      Glucose, UA Negative mg/dl      Ketones, UA Negative mg/dl      Urobilinogen, UA <2.0 mg/dl      Bilirubin, UA Negative     Occult Blood, UA Negative    POCT pregnancy, urine [[id number]]  (Normal) Resulted: 03/29/24 1331    Lab Status: Final result Updated: 03/29/24 1331     EXT Preg Test, Ur Negative     Control Valid    CBC and differential [[id number]]  (Abnormal) Collected: 03/29/24 1320    Lab Status: Final result Specimen: Blood from Arm, Right Updated: 03/29/24 1329     WBC 5.71 Thousand/uL      RBC 4.46 Million/uL      Hemoglobin 13.2 g/dL      Hematocrit 39.9 %      MCV 90 fL      MCH 29.6 pg      MCHC 33.1 g/dL      RDW 12.7 %      MPV 10.5 fL      Platelets 263 Thousands/uL      nRBC 0 /100 WBCs      Neutrophils Relative 46 %      Immature Grans % 0 %      Lymphocytes Relative 30 %      Monocytes Relative 8 %      Eosinophils Relative 15 %      Basophils Relative 1 %      Neutrophils Absolute 2.60 Thousands/µL      Absolute Immature Grans 0.01 Thousand/uL      Absolute Lymphocytes 1.72 Thousands/µL      Absolute Monocytes 0.47 Thousand/µL      Eosinophils Absolute 0.86 Thousand/µL      Basophils Absolute 0.05 Thousands/µL                    CT abdomen pelvis with contrast   Final Result by Cameron Leblanc MD (03/29 1511)      No acute inflammatory changes in the abdomen or pelvis.      Prominent colonic stool burden may indicate constipation. Note is also made of fecalization of multiple segments of small bowel possibly indicating a motility disorder.         Workstation performed: [id number]               Procedures  Procedures      ED  Course  ED Course as of 03/31/24 1805   Fri Mar 29, 2024   1344 Control: Valid   1344 Leukocytes, UA: Negative   1344 Nitrite, UA: Negative   1344 WBC: 5.71  No leukocytosis or leukopenia   1344 Hemoglobin: 13.2  Stable    1344 Platelet Count: 263   1430 Comprehensive metabolic panel  Unremarkable    1430 LIPASE: 19   1521 CT abdomen pelvis with contrast  IMPRESSION:     No acute inflammatory changes in the abdomen or pelvis.     Prominent colonic stool burden may indicate constipation. Note is also made of fecalization of multiple segments of small bowel possibly indicating a motility disorder.     1525 Discussed CT results with patient, will d/c with Miralax and Pepcid as well as GI follow up.                              SBIRT 22yo+      Flowsheet Row Most Recent Value   Initial Alcohol Screen: US AUDIT-C     1. How often do you have a drink containing alcohol? 1 Filed at: 03/29/2024 1234   2. How many drinks containing alcohol do you have on a typical day you are drinking?  0 Filed at: 03/29/2024 1234   3b. FEMALE Any Age, or MALE 65+: How often do you have 4 or more drinks on one occassion? 0 Filed at: 03/29/2024 1234   Audit-C Score 1 Filed at: 03/29/2024 1234   [place]: How many times in the past year have you...    Used an illegal drug or used a prescription medication for non-medical reasons? Never Filed at: 03/29/2024 1234                  Medical Decision Making  Amount and/or Complexity of Data Reviewed  Labs: ordered. Decision-making details documented in ED Course.  Radiology: ordered. Decision-making details documented in ED Course.    Risk  Prescription drug management.        Patient is 23 y.o. female with no PMH  who presents to the ED with abdominal pain . See history and physical documented above.     Differential diagnosis included but not limited to gastritis, appendicitis, cholecystitis, diverticulitis, pancreatitis . Plan CBC, CMP, lipase, upreg, UA CT abd/pelvis     View ED course above for  further discussion on patient workup.       All labs reviewed and utilized in the medical decision making process  All radiology studies independently viewed by me and interpreted by the radiologist.  I reviewed all testing with the patient.     Upon re-evaluation patient feeling better.  Discussed findings on CT including constipation.  Recommended Pepcid and GlycoLax for symptom management.  Also referred to GI.    I have reviewed the patient's vital signs, nursing notes, and other relevant tests/information. I had a detailed discussion with the patient regarding the history, exam findings, and any diagnostic results.   Plan to discharge home in stable condition with constipation, follow up with PCP and GI.  Discussed with patient who is agreeable to plan.  I discussed discharge instructions, need for follow-up, and oral return precautions for what to return for in addition to the written return precautions and discharge instructions, specifically highlighting areas of special concern.  The patient verbalized understanding of the discharge instructions and warnings that would necessitate return to the Emergency Department including worsening pain, nausea/vomiting, fever  All questions the patient had were answered prior to discharge to the best of my ability.       Disposition  Final diagnoses:   Constipation   Abdominal pain     Time reflects when diagnosis was documented in both MDM as applicable and the Disposition within this note       Time User Action Codes Description Comment    3/29/2024  3:26 PM Lorna Wu Add [K59.00] Constipation     3/29/2024  3:26 PM Lorna Wu Add [R10.9] Abdominal pain           ED Disposition       ED Disposition   Discharge    Condition   Stable    Date/Time   Fri Mar 29, 2024 1526    Comment   Dolores Allan discharge to home/self care.                   Follow-up Information       Follow up With Specialties Details Why Contact Info Additional Information     Danya Mendoza,  Family Medicine   [address]  [address]  [place] [address]  [phone]       [facility] Emergency Department Emergency Medicine Go to  If symptoms worsen [address] [facility] [address] [phone] [facility] Emergency Department, [address], [place], [address] [phone]            Discharge Medication List as of 3/29/2024  3:29 PM        START taking these medications    Details   famotidine (PEPCID) 20 mg tablet Take 1 tablet (20 mg total) by mouth 2 (two) times a day, Starting Fri 3/29/2024, Until Sun 4/28/2024, Normal      polyethylene glycol (GLYCOLAX) 17 GM/SCOOP powder Take 17 g by mouth daily, Starting Fri 3/29/2024, Normal           CONTINUE these medications which have NOT CHANGED    Details   albuterol (PROVENTIL HFA,VENTOLIN HFA) 90 mcg/act inhaler INHALE 2 PUFFS EVERY 6 HOURS AS NEEDED FOR WHEEZING, Normal      ketoconazole (NIZORAL) 2 % shampoo Apply 1 Application topically 2 (two) times a week, Starting Thu 11/23/2023, Normal      Tri-Sprintec 0.18/0.215/0.25 MG-35 MCG per tablet Take 1 tablet by mouth daily, Starting Tue 10/3/2023, Normal               PDMP Review       None             ED Provider  Attending physically available and evaluated Dolores Allan. I managed the patient along with the ED Attending.    Electronically Signed by           Lorna Wu DO  03/31/24 4013

## 2024-03-29 NOTE — ED ATTENDING ATTESTATION
I saw and evaluated the patient. I have discussed the patient with the resident physician and agree with the resident's findings, assessment and plan as documented in the resident physician's note, unless otherwise documented below. All available laboratory and imaging studies were reviewed by myself.  I was present for key portions of any procedure(s) performed by the resident and I was immediately available to provide assistance.     I agree with the current assessment done in the Emergency Department. I have conducted an independent evaluation of this patient    Final Diagnosis:  1. Constipation    2. Abdominal pain             Chief Complaint   Patient presents with    Abdominal Pain     Upper central abdominal pain for about 1 week. Comes in waves and with nausea. Bowel movements normal. No fever at home.     This is a 23 y.o. female presenting for evaluation of abdominal pain. Pain x 1 week, intermittent, localized to periumbilical/epigastric area, occasionally radiating into chest, no modifying factors. Episodes last a few hours at a time and occur a few times per day. No leg pain or swelling, recent immobilization or surgery, major trauma, hemoptysis, personal or family history of VTE, history of recent malignancy, or use of exogenous estrogen.   Denies fever, chills, cough, SOB, n/v/d,  urinary symptoms, vaginal discharge, headache, any other complaints. Currently on menstrual period.     PMH:   has no past medical history on file.    PSH:   has a past surgical history that includes Tympanostomy tube placement (Bilateral) and [place] tooth extraction (Bilateral).    Social:  Social History     Substance and Sexual Activity   Alcohol Use Never     Social History     Tobacco Use   Smoking Status Never   Smokeless Tobacco Never     Social History     Substance and Sexual Activity   Drug Use Never     PE:  Vitals:    03/29/24 1231 03/29/24 1345 03/29/24 1415 03/29/24 1532   BP: 135/81   114/77   BP Location:  Right arm      Pulse: 73 64 68 60   Resp: 16 17 18 16   Temp: 98.1 °F (36.7 °C)      TempSrc: Oral      SpO2: 100%  100% 100%         - 13 point ROS was performed and all are normal unless stated in the history above.   ROS: Negative for fever, chills, cough, SOB, n/v/d,  headache, rash  - Nursing note reviewed. Vitals reviewed.       Physical exam:  GENERAL APPEARANCE: Appears comfortable, no acute distress, calm and cooperative   NEURO: GCS 15, no focal deficits, cranial nerves grossly intact, clear fluent speech, no facial asymmetry   HEENT: Normocephalic, atraumatic, moist mucous membranes   Neck: Supple, full ROM  CV: RRR, no murmurs, rubs, or gallops  LUNGS: CTAB, no wheezing, rales, or rhonchi  GI: Abdomen soft, non-tender, no rebound or guarding   MSK: Extremities non-tender, no pitting edema  SKIN: Warm and dry    Procedure Note: EKG  Date/Time: 03/29/24 8:15 PM   Interpreted by: Lorna Ríos  Indications / Diagnosis: CP  ECG reviewed by me, the ED Provider: yes   The EKG demonstrates:  Rate: 62  Rhythm: normal sinus  Intervals: normal intervals  Axis: Right axis  QRS/Blocks: normal QRS  ST Changes: No acute ST Changes, no STD/HANNY.      Assessment and plan: This is a 23 y.o. female presenting for evaluation of abdominal pain. Within the differential diagnosis for abdominal pain consider hepatitis, pancreatitis, cholecystitis, gastritis, pregnancy related etiology, musculoskeletal. Will obtain workup to assess for these etiologies and medicate for symptoms.       Final assessment: CT demonstrates constipation. Workup otherwise reassuring.  Abdominal exam benign on reassessment. Strict ED return precautions provided should symptoms worsen and patient can otherwise follow up outpatient. Patient expresses an understanding and agreement with the plan and remains in good condition for discharge.         Code Status: No Order  Advance Directive and Living Will:      Power of :    POLST:      Medications    iohexol (OMNIPAQUE) 350 MG/ML injection (MULTI-DOSE) 100 mL (100 mL Intravenous Given 3/29/24 1436)     CT abdomen pelvis with contrast   Final Result      No acute inflammatory changes in the abdomen or pelvis.      Prominent colonic stool burden may indicate constipation. Note is also made of fecalization of multiple segments of small bowel possibly indicating a motility disorder.         Workstation performed: [id number]           Orders Placed This Encounter   Procedures    CT abdomen pelvis with contrast    CBC and differential    Comprehensive metabolic panel    Lipase    UA w Reflex to Microscopic w Reflex to Culture    Ambulatory Referral to Gastroenterology    POCT pregnancy, urine    ECG 12 lead    ECG 12 lead     Labs Reviewed   CBC AND DIFFERENTIAL - Abnormal       Result Value Ref Range Status    WBC 5.71  4.31 - 10.16 Thousand/uL Final    RBC 4.46  3.81 - 5.12 Million/uL Final    Hemoglobin 13.2  11.5 - 15.4 g/dL Final    Hematocrit 39.9  34.8 - 46.1 % Final    MCV 90  82 - 98 fL Final    MCH 29.6  26.8 - 34.3 pg Final    MCHC 33.1  31.4 - 37.4 g/dL Final    RDW 12.7  11.6 - 15.1 % Final    MPV 10.5  8.9 - 12.7 fL Final    Platelets 263  149 - 390 Thousands/uL Final    nRBC 0  /100 WBCs Final    Neutrophils Relative 46  43 - 75 % Final    Immature Grans % 0  0 - 2 % Final    Lymphocytes Relative 30  14 - 44 % Final    Monocytes Relative 8  4 - 12 % Final    Eosinophils Relative 15 (*) 0 - 6 % Final    Basophils Relative 1  0 - 1 % Final    Neutrophils Absolute 2.60  1.85 - 7.62 Thousands/µL Final    Absolute Immature Grans 0.01  0.00 - 0.20 Thousand/uL Final    Absolute Lymphocytes 1.72  0.60 - 4.47 Thousands/µL Final    Absolute Monocytes 0.47  0.17 - 1.22 Thousand/µL Final    Eosinophils Absolute 0.86 (*) 0.00 - 0.61 Thousand/µL Final    Basophils Absolute 0.05  0.00 - 0.10 Thousands/µL Final   LIPASE - Normal    Lipase 19  11 - 82 u/L Final   POCT PREGNANCY, URINE - Normal    EXT Preg Test, Ur  Negative   Final    Control Valid   Final   COMPREHENSIVE METABOLIC PANEL    Sodium 136  135 - 147 mmol/L Final    Potassium 4.3  3.5 - 5.3 mmol/L Final    Chloride 103  96 - 108 mmol/L Final    CO2 27  21 - 32 mmol/L Final    ANION GAP 6  4 - 13 mmol/L Final    BUN 14  5 - 25 mg/dL Final    Creatinine 0.67  0.60 - 1.30 mg/dL Final    Comment: Standardized to IDMS reference method    Glucose 83  65 - 140 mg/dL Final    Comment: If the patient is fasting, the ADA then defines impaired fasting glucose as > 100 mg/dL and diabetes as > or equal to 123 mg/dL.    Calcium 9.3  8.4 - 10.2 mg/dL Final    AST 20  13 - 39 U/L Final    ALT 20  7 - 52 U/L Final    Comment: Specimen collection should occur prior to Sulfasalazine administration due to the potential for falsely depressed results.     Alkaline Phosphatase 53  34 - 104 U/L Final    Total Protein 7.6  6.4 - 8.4 g/dL Final    Albumin 4.1  3.5 - 5.0 g/dL Final    Total Bilirubin 0.58  0.20 - 1.00 mg/dL Final    Comment: Use of this assay is not recommended for patients undergoing treatment with eltrombopag due to the potential for falsely elevated results.  N-acetyl-p-benzoquinone imine (metabolite of Acetaminophen) will generate erroneously low results in samples for patients that have taken an overdose of Acetaminophen.    eGFR 123  ml/min/1.73sq m Final    Narrative:     National Kidney Disease Foundation guidelines for Chronic Kidney Disease (CKD):     Stage 1 with normal or high GFR (GFR > 90 mL/min/1.73 square meters)    Stage 2 Mild CKD (GFR = 60-89 mL/min/1.73 square meters)    Stage 3A Moderate CKD (GFR = 45-59 mL/min/1.73 square meters)    Stage 3B Moderate CKD (GFR = 30-44 mL/min/1.73 square meters)    Stage 4 Severe CKD (GFR = 15-29 mL/min/1.73 square meters)    Stage 5 End Stage CKD (GFR <15 mL/min/1.73 square meters)  Note: GFR calculation is accurate only with a steady state creatinine   UA W REFLEX TO MICROSCOPIC WITH REFLEX TO CULTURE    Color, UA Light  Yellow   Final    Clarity, UA Clear   Final    Specific Gravity, UA 1.017  1.003 - 1.030 Final    pH, UA 6.5  4.5, 5.0, 5.5, 6.0, 6.5, 7.0, 7.5, 8.0 Final    Leukocytes, UA Negative  Negative Final    Nitrite, UA Negative  Negative Final    Protein, UA Negative  Negative mg/dl Final    Glucose, UA Negative  Negative mg/dl Final    Ketones, UA Negative  Negative mg/dl Final    Urobilinogen, UA <2.0  <2.0 mg/dl mg/dl Final    Bilirubin, UA Negative  Negative Final    Occult Blood, UA Negative  Negative Final         Time reflects when diagnosis was documented in both MDM as applicable and the Disposition within this note       Time User Action Codes Description Comment    3/29/2024  3:26 PM Lorna Wu Add [K59.00] Constipation     3/29/2024  3:26 PM Lorna Wu Add [R10.9] Abdominal pain           ED Disposition       ED Disposition   Discharge    Condition   Stable    Date/Time   Fri Mar 29, 2024  3:26 PM    Comment   Dolores Allan discharge to home/self care.                   Follow-up Information       Follow up With Specialties Details Why Contact Info Additional Information    Danya Mendoza,  Family Medicine   [address]  [address]  [facility] [address] [phone]       [facility] Emergency Department Emergency Medicine Go to  If symptoms worsen [address] [place] [id number] [phone] On license of [facility] Emergency Department, [address] [place] [address], [address] [phone]          Discharge Medication List as of 3/29/2024  3:29 PM        START taking these medications    Details   famotidine (PEPCID) 20 mg tablet Take 1 tablet (20 mg total) by mouth 2 (two) times a day, Starting Fri 3/29/2024, Until Sun 4/28/2024, Normal      polyethylene glycol (GLYCOLAX) 17 GM/SCOOP powder Take 17 g by mouth daily, Starting Fri 3/29/2024, Normal           CONTINUE these medications which have NOT CHANGED    Details   albuterol  "(PROVENTIL HFA,VENTOLIN HFA) 90 mcg/act inhaler INHALE 2 PUFFS EVERY 6 HOURS AS NEEDED FOR WHEEZING, Normal      ketoconazole (NIZORAL) 2 % shampoo Apply 1 Application topically 2 (two) times a week, Starting Thu 11/23/2023, Normal      Tri-Sprintec 0.18/0.215/0.25 MG-35 MCG per tablet Take 1 tablet by mouth daily, Starting Tue 10/3/2023, Normal             Prior to Admission Medications   Prescriptions Last Dose Informant Patient Reported? Taking?   Tri-Sprintec 0.18/0.215/0.25 MG-35 MCG per tablet   No No   Sig: Take 1 tablet by mouth daily   albuterol (PROVENTIL HFA,VENTOLIN HFA) 90 mcg/act inhaler   No No   Sig: INHALE 2 PUFFS EVERY 6 HOURS AS NEEDED FOR WHEEZING   ketoconazole (NIZORAL) 2 % shampoo   No No   Sig: Apply 1 Application topically 2 (two) times a week      Facility-Administered Medications: None         Portions of the record may have been created with voice recognition software. Occasional wrong word or \"sound a like\" substitutions may have occurred due to the inherent limitations of voice recognition software. Read the chart carefully and recognize, using context, where substitutions have occurred.    Electronically signed by:  Lorna Ríos    "

## 2024-04-18 ENCOUNTER — TELEMEDICINE (OUTPATIENT)
Dept: PSYCHIATRY | Facility: CLINIC | Age: 24
End: 2024-04-18

## 2024-04-18 DIAGNOSIS — F41.9 ANXIETY: Primary | ICD-10-CM

## 2024-04-18 NOTE — PSYCH
"Behavioral Health Psychotherapy Progress Note    Psychotherapy Provided: Individual Psychotherapy     1. Anxiety            Goals addressed in session: Goal 1     DATA: Gisel presented for session and met at her home.  Gisel reports that she recently began a part time job to be gallo to  more money since her student loans kicked in.  \"I was consumed by this a little and felt like things were being ruined but I was able to reframe this\".  \"I started to work at [employer]\".  Gisel was able to identify that she has struggled with allowing her boyfriend to help her.  Is it feeling like you owe someone if they help you or is it a certain aspect of needing to be self sufficient?  \"I feel like he did not sign up for this or that dating me he would have to help me financially so soon, I just feel like it is not fair that he feels like he has to\".      Gisel reports that she has ended her time with her personal nutritionist.  She reports that she has gotten herself to a weight where she feels the best that she has in a long time. \"I can wear a bikini again and feel good in the skin that I am in\".  Gisel went on to processed this increased awareness that she has gained that when she is anxious her appetite and weight decrease as well in the more recent past (6 months) she felt depression and that her weight increased to the highest that she has ever been at.    Gisel reports that her fathers health is doing well and that she can see that there is no anxiety that she is feeling in regards to this.  Gisel reports that her mother has continued to cut back on her alcohol use and that this is really going well.  \"I think the largest thing is that she and I are not living together and I can see that this has made our relationship much better\".    Gisel reports that overall her anxiety and depression have been very stable and that she has not felt any experiences that have disturbed her or daily functionality.    During this session, " "this clinician used the following therapeutic modalities: Cognitive Processing Therapy    Substance Abuse was not addressed during this session. If the client is diagnosed with a co-occurring substance use disorder, please indicate any changes in the frequency or amount of use: n/a. Stage of change for addressing substance use diagnoses: No substance use/Not applicable    ASSESSMENT:  Dolores Allan presents with a Euthymic/ normal mood her affect is Normal range and intensity, which is congruent, with her mood and the content of the session. The client has made progress on their goals. Dolores Allan presents with a minimal risk of suicide, minimal risk of self-harm, and none risk of harm to others.    For any risk assessment that surpasses a \"low\" rating, a safety plan must be developed.    A safety plan was indicated: no  If yes, describe in detail n/a    PLAN: Between sessions, Dolores Allan will apply maintenance to her changes that she has implemented. At the next session, the therapist will use Motivational Interviewing and Supportive Psychotherapy to address treatment plan.     Behavioral Health Treatment Plan and Discharge Planning: Dolores Allan is aware of and agrees to continue to work on their treatment plan. They have identified and are working toward their discharge goals. yes    Visit start and stop times:    04/18/24  Start Time: 1100  Stop Time: 1150  Total Visit Time: 50 minutes  Virtual Regular Visit    Verification of patient location:    Patient is located at Home in the following state in which I hold an active license [place]      Assessment/Plan:    Problem List Items Addressed This Visit       Anxiety - Primary       Goals addressed in session: Goal 1          Reason for visit is No chief complaint on file.       Encounter provider Jesika Cervantes LCSW    Provider located at [facility] THERAPY[address] " [place] [id number] [phone]      Recent Visits  No visits were found meeting these conditions.  Showing recent visits within past 7 days and meeting all other requirements  Today's Visits  Date Type Provider Dept   04/18/24 Telemedicine Jesika Cervantes LCSW [facility]   Showing today's visits and meeting all other requirements  Future Appointments  No visits were found meeting these conditions.  Showing future appointments within next 150 days and meeting all other requirements       The patient was identified by name and date of birth. Dolores Allan was informed that this is a telemedicine visit and that the visit is being conducted throughthe Epic Embedded platform. She agrees to proceed..  My office door was closed. No one else was in the room.  She acknowledged consent and understanding of privacy and security of the video platform. The patient has agreed to participate and understands they can discontinue the visit at any time.    Patient is aware this is a billable service.     Subjective  Dolores Allan is a 23 y.o. female  .      HPI     No past medical history on file.    Past Surgical History:   Procedure Laterality Date    TYMPANOSTOMY TUBE PLACEMENT Bilateral     WISDOM TOOTH EXTRACTION Bilateral        Current Outpatient Medications   Medication Sig Dispense Refill    albuterol (PROVENTIL HFA,VENTOLIN HFA) 90 mcg/act inhaler INHALE 2 PUFFS EVERY 6 HOURS AS NEEDED FOR WHEEZING 18 g 0    famotidine (PEPCID) 20 mg tablet Take 1 tablet (20 mg total) by mouth 2 (two) times a day 60 tablet 0    ketoconazole (NIZORAL) 2 % shampoo Apply 1 Application topically 2 (two) times a week 120 mL 1    polyethylene glycol (GLYCOLAX) 17 GM/SCOOP powder Take 17 g by mouth daily 510 g 0    Tri-Sprintec 0.18/0.215/0.25 MG-35 MCG per tablet Take 1 tablet by mouth daily 84 tablet 3     No current facility-administered medications for this visit.        Allergies   Allergen  Reactions    Penicillin G Other (See Comments)       Review of Systems    Video Exam    There were no vitals filed for this visit.    Physical Exam     Visit Time    04/18/24  Start Time: 1100  Stop Time: 1150  Total Visit Time: 50 minutes

## 2024-04-22 ENCOUNTER — CONSULT (OUTPATIENT)
Dept: GASTROENTEROLOGY | Facility: CLINIC | Age: 24
End: 2024-04-22
Payer: COMMERCIAL

## 2024-04-22 VITALS
TEMPERATURE: 97.4 F | WEIGHT: 138.2 LBS | BODY MASS INDEX: 23.6 KG/M2 | DIASTOLIC BLOOD PRESSURE: 72 MMHG | HEIGHT: 64 IN | SYSTOLIC BLOOD PRESSURE: 116 MMHG

## 2024-04-22 DIAGNOSIS — K59.00 CONSTIPATION: ICD-10-CM

## 2024-04-22 DIAGNOSIS — R10.9 ABDOMINAL PAIN: ICD-10-CM

## 2024-04-22 PROCEDURE — 99244 OFF/OP CNSLTJ NEW/EST MOD 40: CPT | Performed by: PHYSICIAN ASSISTANT

## 2024-04-22 NOTE — PATIENT INSTRUCTIONS
Increase daily water intake to at least 64 ounces of water/day   Start miralax daily as needed for constipation   Get Blood work      Patient will follow up with Brooke 08/23/2024 at 9:30 am   Patient was given labs to be done

## 2024-04-22 NOTE — PROGRESS NOTES
"[facility] - Outpatient Consultation  Dolores Allan 23 y.o. female MRN: [id number]  Encounter: [id number]          ASSESSMENT AND PLAN:      Dolores is a 22 y/o female who presents for consultation of constipation.     1. Chronic Constipation  2. Abdominal pain  Pt says she has had issues with constipation since she was young. She says she can go 3-4 days without a BM at times and when it gets to this point, she will have \"Severe\" 10/10 abdominal pain. She went to the ED due to his pain on 3/29 and CT noted \"Prominent colonic stool burden may indicate constipation. Note is also made of fecalization of multiple segments of small bowel possibly indicating a motility disorder. \" CBC, LFTs and lipase WNL. She does admit to drinking very little water/day. She says miralax was working for her but she says she only took this for a short time as it caused too much gas.   - TSH, celiac panel ordered  -increase daily water intake to at least 64 ounces of water/day  -explained that without adequate water intake, miralax usage can certainly be associated with increased gas. She says she would be interested in re-starting this while she works on her water intake  -re-start miralax daily PRN constipation    -close follow-up: once we get better handle of her constipation, would like for her to undergo colonoscopy. I am ok with holding off on this for now due to lack of alarm symptoms      ______________________________________________________________________    HPI:  Dolores is a 22 y/o female who presents for consultation of constipation. Pt says she has had issues with constipation since she was young. She says she can go 3-4 days without a BM at times and when it gets to this point, she will have \"Severe\" 10/10 abdominal pain. She went to the ED due to his pain. She does admit to drinking very little water/day. She says miralax was working for her but she says she only took this for a " Add Intralesional Injection: No Render Post-Care In The Note: Yes short time as it caused too much gas. She denies family hx of colon cancer, unintentional weight loss, fevers, night sweats, vomiting, heartburn, diarrhea, NSAID use, bloody or black BM. Pt says she does get nauseous and gets chills when the pain is severe but otherwise does not have these symptoms. She denies prior abdominal surgical hx.       REVIEW OF SYSTEMS:    CONSTITUTIONAL: Denies any fever, chills, rigors, and weight loss.  HEENT: No earache or tinnitus. Denies hearing loss or visual disturbances.  CARDIOVASCULAR: No chest pain or palpitations.   RESPIRATORY: Denies any cough, hemoptysis, shortness of breath or dyspnea on exertion.  GASTROINTESTINAL: As noted in the History of Present Illness.   GENITOURINARY: No problems with urination. Denies any hematuria or dysuria.  NEUROLOGIC: No dizziness or vertigo, denies headaches.   MUSCULOSKELETAL: Denies any muscle or joint pain.   SKIN: Denies skin rashes or itching.   ENDOCRINE: Denies excessive thirst. Denies intolerance to heat or cold.  PSYCHOSOCIAL: Denies depression or anxiety. Denies any recent memory loss.       Historical Information   History reviewed. No pertinent past medical history.  Past Surgical History:   Procedure Laterality Date    TYMPANOSTOMY TUBE PLACEMENT Bilateral     WISDOM TOOTH EXTRACTION Bilateral      Social History   Social History     Substance and Sexual Activity   Alcohol Use Never     Social History     Substance and Sexual Activity   Drug Use Never     Social History     Tobacco Use   Smoking Status Never   Smokeless Tobacco Never     Family History   Problem Relation Age of Onset    Coronary artery disease Father        Meds/Allergies       Current Outpatient Medications:     albuterol (PROVENTIL HFA,VENTOLIN HFA) 90 mcg/act inhaler    famotidine (PEPCID) 20 mg tablet    ketoconazole (NIZORAL) 2 % shampoo    polyethylene glycol (GLYCOLAX) 17 GM/SCOOP powder    Tri-Sprintec 0.18/0.215/0.25 MG-35 MCG per tablet    Allergies  "  Allergen Reactions    Penicillin G Other (See Comments)           Objective     Blood pressure 116/72, temperature (!) 97.4 °F (36.3 °C), temperature source Tympanic, height 5' 4\" (1.626 m), weight 62.7 kg (138 lb 3.2 oz), last menstrual period 03/26/2024. Body mass index is 23.72 kg/m².        PHYSICAL EXAM:      General Appearance:   Alert, cooperative, no distress   HEENT:   Normocephalic, atraumatic, anicteric.     Neck:  Supple, symmetrical, trachea midline   Lungs:   Clear to auscultation bilaterally; no rales, rhonchi or wheezing; respirations unlabored    Heart::   Regular rate and rhythm; no murmur, rub, or gallop.   Abdomen:   Soft, non-tender, non-distended; normal bowel sounds; no masses, no organomegaly    Genitalia:   Deferred    Rectal:   Deferred    Extremities:  No cyanosis, clubbing or edema    Pulses:  2+ and symmetric    Skin:  No jaundice, rashes, or lesions    Lymph nodes:  No palpable cervical lymphadenopathy        Lab Results:   No visits with results within 1 Day(s) from this visit.   Latest known visit with results is:   Admission on 03/29/2024, Discharged on 03/29/2024   Component Date Value    WBC 03/29/2024 5.71     RBC 03/29/2024 4.46     Hemoglobin 03/29/2024 13.2     Hematocrit 03/29/2024 39.9     MCV 03/29/2024 90     MCH 03/29/2024 29.6     MCHC 03/29/2024 33.1     RDW 03/29/2024 12.7     MPV 03/29/2024 10.5     Platelets 03/29/2024 263     nRBC 03/29/2024 0     Segmented % 03/29/2024 46     Immature Grans % 03/29/2024 0     Lymphocytes % 03/29/2024 30     Monocytes % 03/29/2024 8     Eosinophils Relative 03/29/2024 15 (H)     Basophils Relative 03/29/2024 1     Absolute Neutrophils 03/29/2024 2.60     Absolute Immature Grans 03/29/2024 0.01     Absolute Lymphocytes 03/29/2024 1.72     Absolute Monocytes 03/29/2024 0.47     Eosinophils Absolute 03/29/2024 0.86 (H)     Basophils Absolute 03/29/2024 0.05     Sodium 03/29/2024 136     Potassium 03/29/2024 4.3     Chloride 03/29/2024 " Post-Care Instructions: CRYOSURGERY\\n\\n\\nThe procedure you have just had using liquid nitrogen is called cryosurgery.  Liquid nitrogen is minus (-) 195.8O C and must be stored in special containers.  It evaporates on contact with the air, which is why it appears to smoke.\\n\\nCryosurgery is a surgical technique that avoids cutting, burning or the need for anesthesia.  For selected lesions or growths, cryosurgery is the best treatment because of its safety, minimal post-surgical care and excellent cosmetic results.\\n\\nFollowing treatment, the lesion or growth will appear unchanged.  Soon afterwards, the area will become red and slightly swollen.  Within 24 to 48 hours, a blister or water bubble often appears.  THIS IS NORMAL AND EXPECTED.  If a blister forms, I recommend popping the blister with a sterile needle. This reduces the pressure and keeps the blister from growing.\\n\\nIf left alone, the blister will slowly resolve and the entire area will turn into a scab.  Regardless of whether the blister breaks or not, the healing will continue as expected.  The scab will fall off by itself when it is ready.  From the day of cryosurgery to the day when the scab falls off is usually about two - three weeks.  A faint pink spot will be present that will slowly fade away.\\n\\nYou may carry on normal activities immediately after therapy including bathing.  There are no restrictions, however, you should be careful not to irritate or traumatize the area.\\n\\nWhen cryotherapy is used for warts, keep in mind that warts may be very stubborn!  The virus causing the wart may be a strong strain, so the treatment may need to be repeated.  Usually you will know if the wart is still present within three to four weeks, so you may return for another treatment.\\n\\nIf there are any problems or questions, please call our office. 103     CO2 03/29/2024 27     ANION GAP 03/29/2024 6     BUN 03/29/2024 14     Creatinine 03/29/2024 0.67     Glucose 03/29/2024 83     Calcium 03/29/2024 9.3     AST 03/29/2024 20     ALT 03/29/2024 20     Alkaline Phosphatase 03/29/2024 53     Total Protein 03/29/2024 7.6     Albumin 03/29/2024 4.1     Total Bilirubin 03/29/2024 0.58     eGFR 03/29/2024 123     Lipase 03/29/2024 19     EXT Preg Test, Ur 03/29/2024 Negative     Control 03/29/2024 Valid     Color, UA 03/29/2024 Light Yellow     Clarity, UA 03/29/2024 Clear     Specific Gravity, UA 03/29/2024 1.017     pH, UA 03/29/2024 6.5     Leukocytes, UA 03/29/2024 Negative     Nitrite, UA 03/29/2024 Negative     Protein, UA 03/29/2024 Negative     Glucose, UA 03/29/2024 Negative     Ketones, UA 03/29/2024 Negative     Urobilinogen, UA 03/29/2024 <2.0     Bilirubin, UA 03/29/2024 Negative     Occult Blood, UA 03/29/2024 Negative     Ventricular Rate 03/29/2024 62     Atrial Rate 03/29/2024 62     AK Interval 03/29/2024 172     QRSD Interval 03/29/2024 76     QT Interval 03/29/2024 398     QTC Interval 03/29/2024 403     P Axis 03/29/2024 33     QRS [place] 03/29/2024 96     T Wave Axis 03/29/2024 14          Radiology Results:   CT abdomen pelvis with contrast    Result Date: 3/29/2024  Narrative: CT ABDOMEN AND PELVIS WITH IV CONTRAST INDICATION: periumbilial/epigastric pain. COMPARISON: Pelvic ultrasound 9/15/2023. TECHNIQUE: CT examination of the abdomen and pelvis was performed. Multiplanar 2D reformatted images were created from the source data. This examination, like all CT scans performed in the [facility], was performed utilizing techniques to minimize radiation dose exposure, including the use of iterative reconstruction and automated exposure control. Radiation dose length product (DLP) for this visit: 313.71 mGy-cm IV Contrast: 100 mL of iohexol (OMNIPAQUE) Enteric Contrast: Not administered. FINDINGS: ABDOMEN LOWER CHEST: No clinically  Number Of Freeze-Thaw Cycles: 3 freeze-thaw cycles significant abnormality in the visualized lower chest. LIVER/BILIARY TREE: Unremarkable. GALLBLADDER: No calcified gallstones. No pericholecystic inflammatory change. SPLEEN: Unremarkable. PANCREAS: Unremarkable. ADRENAL GLANDS: Unremarkable. KIDNEYS/URETERS: No hydronephrosis or urinary tract calculi. Subcentimeter hypoattenuating renal lesion(s), too small to characterize but statistically likely benign, which do not warrant follow-up (Radiology June 2019). STOMACH AND BOWEL: Prominent colonic stool burden. Without bowel obstruction. Note is made of fecalization of multiple segments of small bowel possibly indicating a motility disorder. APPENDIX: Noninflamed. ABDOMINOPELVIC CAVITY: No ascites. No pneumoperitoneum. No lymphadenopathy. VESSELS: Unremarkable for patient's age. PELVIS REPRODUCTIVE ORGANS: Unremarkable for patient's age. URINARY BLADDER: Unremarkable. ABDOMINAL WALL/INGUINAL REGIONS: Unremarkable. BONES: No acute fracture or suspicious osseous lesion.     Impression: No acute inflammatory changes in the abdomen or pelvis. Prominent colonic stool burden may indicate constipation. Note is also made of fecalization of multiple segments of small bowel possibly indicating a motility disorder. Workstation performed: [id number]     Pre-Procedure: The surgical site was antiseptically prepared. Bill As A Line Item Or As Units: Line Item Total Volume (Ccs): 1 Size Of Lesion In Cm: 1.7 Anesthesia Volume In Cc: 0 Detail Level: Detailed Medication Injected: 5-Fluorouracil Total Time In Minutes: 3 minutes Additional Information: (Optional): The patient did not want Mohs. The wound was cleaned, and a dressing was applied.  The patient received detailed post-op instructions. Consent was obtained from the patient. The risks and benefits to therapy were discussed in detail. Specifically, the risks of infection, scarring, bleeding, prolonged wound healing, incomplete removal, allergy to anesthesia, nerve injury and recurrence were addressed. Alternatives to liquid nitrogen, such as ED&C, surgical removal, XRT were also discussed.  Prior to the procedure, the treatment site was clearly identified and confirmed by the patient. All components of Universal Protocol/PAUSE Rule completed. Concentration (Mg/Ml Or Millions Of Plaque Forming Units/Cc): 0.01

## 2024-05-29 DIAGNOSIS — J45.909 MILD REACTIVE AIRWAYS DISEASE, UNSPECIFIED WHETHER PERSISTENT: ICD-10-CM

## 2024-05-30 RX ORDER — ALBUTEROL SULFATE 90 UG/1
AEROSOL, METERED RESPIRATORY (INHALATION)
Qty: 8.5 G | Refills: 0 | Status: SHIPPED | OUTPATIENT
Start: 2024-05-30

## 2024-06-07 ENCOUNTER — TELEMEDICINE (OUTPATIENT)
Dept: PSYCHIATRY | Facility: CLINIC | Age: 24
End: 2024-06-07
Payer: COMMERCIAL

## 2024-06-07 ENCOUNTER — DOCUMENTATION (OUTPATIENT)
Dept: PSYCHIATRY | Facility: CLINIC | Age: 24
End: 2024-06-07

## 2024-06-07 DIAGNOSIS — F41.9 ANXIETY: Primary | ICD-10-CM

## 2024-06-07 PROCEDURE — 90834 PSYTX W PT 45 MINUTES: CPT | Performed by: SOCIAL WORKER

## 2024-06-07 NOTE — PROGRESS NOTES
"Psychotherapy Discharge Summary    Preferred Name: Dolores Allan  YOB: 2000    Admission date to psychotherapy: 8/16/2023    Referred by: self    Presenting Problem: Two year younger sister, living with mom currently. Gisel reports that her dad is her hero best friend, mentor and person that she looks up to. Dad made every single event in high school, he would switch around his schedule to make sure that he was there for us. Gisel reports that her relationship with her sister is really close. Jennifer (Ena) also resides in the house with Gisel and her mom. Gisel is living with her mother. Mother has since childhood struggled alcoholism. Gisel reports that she is aware of alcoholism and the disease of it but reports that despite knowing this she still experiences guilt. Gisel shared that both her maternal grandparents were alcoholics. Her mother was in the parental role of her family and her parents. \"She is a functional alcoholic and when you talk to her in the day she is normal\". \"She struggles with OCD\". \"Mom feels like she has to take care of everyone and this is a part of her life that she makes us all feel bad for\". Parents  when Gisel was 8 years old. \"I feel like mom holds these things over my head but no one has asked her to do these things\". \"Mom says a lot of things when she is drunk\". \"She struggles with remembering anything when she is drinking\". \"I love my mom but it saddens me to hear her yelling, constantly complaining and yelling at me without having a normal tone in conversation\". \"Mom constantly did not show up many times in sports and growing up\" Father has had two heart attacks and also has diabetes. I am often crippled thinking about this. I have had nightmares of him dying the past two weeks. This is triggered by a thought prior to bed and this invades my dreams. Last heart attack was 1/2021, Gisel was present for this heart attack. Coronary Heart Failure last year, has been " worsened by finances and not having access to finances. The thought of not being close to my dad has been the trigger in my thoughts.     Course of treatment included : individual therapy     Progress/Outcome of Treatment Goals (brief summary of course of treatment) Gisel excelled in past year at completing her treatment goals.  She has regained consistency in her own self care schedule.  Gisel has reported a decrease in her mood fluctuations and reports that she is not experiencing anxiety attacks nor increases her anxious thoughts.  Gisel has reported a decreased in the amount of intrusive and rumination of her worries about her fathers health.  Gisel has reported a change in her and her mothers relationship as well as processed through the emotions and resentments that she has held and implemented boundaries.      Treatment Complications (if any): none    Treatment Progress: excellent    Current [facility] Provider: none    Discharge Medications include:   Current Outpatient Medications on File Prior to Visit   Medication Sig Dispense Refill    albuterol (PROVENTIL HFA,VENTOLIN HFA) 90 mcg/act inhaler TAKE 2 PUFFS BY MOUTH EVERY 6 HOURS AS NEEDED FOR WHEEZE 8.5 g 0    famotidine (PEPCID) 20 mg tablet Take 1 tablet (20 mg total) by mouth 2 (two) times a day 60 tablet 0    ketoconazole (NIZORAL) 2 % shampoo Apply 1 Application topically 2 (two) times a week 120 mL 1    polyethylene glycol (GLYCOLAX) 17 GM/SCOOP powder Take 17 g by mouth daily 510 g 0    Tri-Sprintec 0.18/0.215/0.25 MG-35 MCG per tablet Take 1 tablet by mouth daily 84 tablet 3     No current facility-administered medications on file prior to visit.         Discharge Date: 6/7/2024    Discharge Diagnosis:   1. Anxiety            Criteria for Discharge: completed treatment goals and objectives and is no longer in need of services    Aftercare recommendations include (include specific referral names and phone numbers, if appropriate): Gisel is recommended  to reach out to therapy intake department if symptoms reemerge.      Prognosis: excellent

## 2024-06-07 NOTE — PSYCH
"Behavioral Health Psychotherapy Progress Note    Psychotherapy Provided: Individual Psychotherapy     1. Anxiety            Goals addressed in session: Goal 1 and Goal 2     DATA: Gisel met for session and presented at her home.  Gisel reports that she has been doing well and is just coming back form her vacation in [place] with her boyfriend.  \"I am working on getting my sleep schedule back on track because the three hour difference really threw me for a loop\".  \"My weekend schedule is full all the way through August and I think this is the first time that I have ever had a full calendar like this before\".  Gisel reports that that there are a lot of social events, time with friends, time with family, part time work as well as events that she is looking forward to.  \"Felipe has reminded me to take time, to not overbook myself too much and allow myself be able to sit still\".  \"Me and Felipe are so comfortable here in our home, we do not fight and we are both really happy where we are in our home as well as our relationship\".    How has your mood been managing?  \"I am doing really well\".  \"Me staying steady with the gym, my routines, my self care and really spending time with the people that are important to me\".  \"I lost the weight I put on last year, I am very motivated, I have hobbies, things that I look forward to, I have future plans of things that I want to do like making a pod cast\".  \"I have a lot of things in the works were I look forward to all the positives that are coming for me\".    How are relationships with mom and dad?  \"With mom and I we are in a place in a our relationship that we have never been at before\".  \"I can stay on the phone with her for over an hour, we cook meals together on facetime together, I can see that she has really made an attempt to drink less and her being emotionally present has changed dramatically\".  How about with your dad?  \"I am so close with him but I has so much less anxiety about " "his health and how he is doing because he is very open with me sharing with me what is going on with his medical and how he progressing\".  \"I try to see him every other weekend\".    During this session, this clinician used the following therapeutic modalities: Cognitive Processing Therapy    Substance Abuse was not addressed during this session. If the client is diagnosed with a co-occurring substance use disorder, please indicate any changes in the frequency or amount of use: n/a. Stage of change for addressing substance use diagnoses: No substance use/Not applicable    ASSESSMENT:  Dolores Allan presents with a Euthymic/ normal mood her affect is Normal range and intensity, which is congruent, with her mood and the content of the session. The client has made progress on their goals. Dolores Allan presents with a minimal risk of suicide, none risk of self-harm, and none risk of harm to others.    For any risk assessment that surpasses a \"low\" rating, a safety plan must be developed.    A safety plan was indicated: no  If yes, describe in detail n/a    PLAN: Gisel chart is being closed out successfully as she has completed all treatment plan goals.      Behavioral Health Treatment Plan and Discharge Planning: Dolores Allan is aware of and agrees to continue to work on their treatment plan. They have identified and are working toward their discharge goals. yes    Visit start and stop times:    06/07/24  Start Time: 1105  Stop Time: 1150  Total Visit Time: 45 minutes  Virtual Regular Visit    Verification of patient location:    Patient is located at Home in the following state in which I hold an active license [place]      Assessment/Plan:    Problem List Items Addressed This Visit       Anxiety - Primary       Goals addressed in session: Goal 1 and Goal 2          Reason for visit is No chief complaint on file.       Encounter provider [facility]      Recent Visits  No visits were found meeting " these conditions.  Showing recent visits within past 7 days and meeting all other requirements  Today's Visits  Date Type Provider Dept   06/07/24 Telemedicine Jesika Cervantes LCSW [facility]   Showing today's visits and meeting all other requirements  Future Appointments  No visits were found meeting these conditions.  Showing future appointments within next 150 days and meeting all other requirements       The patient was identified by name and date of birth. Dolores Allan was informed that this is a telemedicine visit and that the visit is being conducted throughthe Epic Embedded platform. She agrees to proceed..  My office door was closed. No one else was in the room.  She acknowledged consent and understanding of privacy and security of the video platform. The patient has agreed to participate and understands they can discontinue the visit at any time.    Patient is aware this is a billable service.     Subjective  Dolores Allan is a 23 y.o. female  .      HPI     No past medical history on file.    Past Surgical History:   Procedure Laterality Date    TYMPANOSTOMY TUBE PLACEMENT Bilateral     WISDOM TOOTH EXTRACTION Bilateral        Current Outpatient Medications   Medication Sig Dispense Refill    albuterol (PROVENTIL HFA,VENTOLIN HFA) 90 mcg/act inhaler TAKE 2 PUFFS BY MOUTH EVERY 6 HOURS AS NEEDED FOR WHEEZE 8.5 g 0    famotidine (PEPCID) 20 mg tablet Take 1 tablet (20 mg total) by mouth 2 (two) times a day 60 tablet 0    ketoconazole (NIZORAL) 2 % shampoo Apply 1 Application topically 2 (two) times a week 120 mL 1    polyethylene glycol (GLYCOLAX) 17 GM/SCOOP powder Take 17 g by mouth daily 510 g 0    Tri-Sprintec 0.18/0.215/0.25 MG-35 MCG per tablet Take 1 tablet by mouth daily 84 tablet 3     No current facility-administered medications for this visit.        Allergies   Allergen Reactions    Penicillin G Other (See Comments)       Review of Systems    Video  Exam    There were no vitals filed for this visit.    Physical Exam     Visit Time    06/07/24  Start Time: 1105  Stop Time: 1150  Total Visit Time: 45 minutes

## 2024-06-10 ENCOUNTER — TELEPHONE (OUTPATIENT)
Dept: PSYCHIATRY | Facility: CLINIC | Age: 24
End: 2024-06-10

## 2024-06-10 NOTE — TELEPHONE ENCOUNTER
DISCHARGE LETTER for [facility] (certified) placed in outgoing mail on 6/11/2024.    Article #:  [id number]    Address:  [address] [place] [address] [address]   [place] [address]

## 2024-06-19 ENCOUNTER — OFFICE VISIT (OUTPATIENT)
Dept: FAMILY MEDICINE CLINIC | Facility: CLINIC | Age: 24
End: 2024-06-19
Payer: COMMERCIAL

## 2024-06-19 VITALS
OXYGEN SATURATION: 99 % | WEIGHT: 139 LBS | HEIGHT: 64 IN | HEART RATE: 88 BPM | SYSTOLIC BLOOD PRESSURE: 108 MMHG | TEMPERATURE: 97.5 F | DIASTOLIC BLOOD PRESSURE: 80 MMHG | BODY MASS INDEX: 23.73 KG/M2

## 2024-06-19 DIAGNOSIS — J45.909 MILD REACTIVE AIRWAYS DISEASE, UNSPECIFIED WHETHER PERSISTENT: ICD-10-CM

## 2024-06-19 DIAGNOSIS — Z00.00 ROUTINE ADULT HEALTH MAINTENANCE: Primary | ICD-10-CM

## 2024-06-19 DIAGNOSIS — Z13.6 SCREENING FOR CARDIOVASCULAR CONDITION: ICD-10-CM

## 2024-06-19 DIAGNOSIS — L50.2 URTICARIA DUE TO COLD: ICD-10-CM

## 2024-06-19 DIAGNOSIS — R21 RASH OF FOOT: ICD-10-CM

## 2024-06-19 PROBLEM — R10.2 PELVIC PAIN: Status: RESOLVED | Noted: 2023-09-01 | Resolved: 2024-06-19

## 2024-06-19 PROCEDURE — 99395 PREV VISIT EST AGE 18-39: CPT | Performed by: FAMILY MEDICINE

## 2024-06-19 PROCEDURE — 99214 OFFICE O/P EST MOD 30 MIN: CPT | Performed by: FAMILY MEDICINE

## 2024-06-19 RX ORDER — KETOCONAZOLE 20 MG/G
CREAM TOPICAL DAILY
Qty: 30 G | Refills: 0 | Status: SHIPPED | OUTPATIENT
Start: 2024-06-19

## 2024-06-19 RX ORDER — ALBUTEROL SULFATE 90 UG/1
2 AEROSOL, METERED RESPIRATORY (INHALATION) EVERY 4 HOURS PRN
Qty: 8.5 G | Refills: 0 | Status: SHIPPED | OUTPATIENT
Start: 2024-06-19

## 2024-06-19 NOTE — PROGRESS NOTES
Adult Annual Physical  Name: Dolores Allan      : 2000      MRN: [id number]  Encounter Provider: Danya Mendoza DO  Encounter Date: 2024   Encounter department: [facility]    Assessment & Plan   1. Routine adult health maintenance  Assessment & Plan:  C/w healthy diet and exercise; proud of patient for quitting vaping; UTD on pap; has dental home; reviewed preventative measures  2. Mild reactive airways disease, unspecified whether persistent  Assessment & Plan:  Currently well controlled; uses about once a month currently; c/w as needed albuterol; f/u guidance given   Orders:  -     albuterol (PROVENTIL HFA,VENTOLIN HFA) 90 mcg/act inhaler; Inhale 2 puffs every 4 (four) hours as needed for wheezing  3. Urticaria due to cold  Assessment & Plan:  Check labs to r/o inflammatory causes; f/u guidance given   Orders:  -     REMA Screen w/ Reflex to Titer/Pattern; Future  -     CBC and differential; Future  -     Sedimentation rate, automated; Future  -     C-reactive protein; Future  4. Screening for cardiovascular condition  -     Comprehensive metabolic panel; Future; Expected date: 2024  -     Lipid Panel with Direct LDL reflex; Future  5. Rash of foot  Assessment & Plan:  Suspect fungal; advised on ketoconazole; will refer to derm if no improvement  Orders:  -     ketoconazole (NIZORAL) 2 % cream; Apply topically daily    Immunizations and preventive care screenings were discussed with patient today. Appropriate education was printed on patient's after visit summary.    Counseling:  Dental Health: discussed importance of regular tooth brushing, flossing, and dental visits.  Exercise: the importance of regular exercise/physical activity was discussed. Recommend exercise 3-5 times per week for at least 30 minutes.          History of Present Illness     Adult Annual Physical:  Patient presents for annual physical. Patient stopped vaping in January. She is eating  "better and learned a lot. Exercising 5 days a week since November. She states she did go to therapy and is doing much better and feels she does not need it anymore. Feels depression is gone and anxiety much better managed.   Patient states asthma seems to be improved. With more exercise she is finding she needs inhaler less. Also significantly improved with stopping vaping. Uses albuterol about once a month.    Also concerned about getting hives with cold exposure. Notices it with cold winds or if something cold touches skin will get hives in area. Goes away usually within 10 minutes.     Did have issue with constipation and went to ER with abdominal pain seemed to be related to her diet changes initially. It has since resolved.     Patient is concerned .     Diet and Physical Activity:  - Diet/Nutrition: well balanced diet.  - Exercise: 5-7 times a week on average.    General Health:  - Sleep: sleeps well.  - Hearing: normal hearing bilateral ears.  - Vision: no vision problems.  - Dental: regular dental visits.    /GYN Health:  - Follows with GYN: yes.   - Menopause: premenopausal.   - History of STDs: no  - Contraception: oral contraceptives.      Advanced Care Planning:  - Has an advanced directive?: no    - Has a durable medical POA?: no    - ACP document given to patient?: no      Review of Systems  Medical History Reviewed by provider this encounter:  Tobacco  Allergies  Meds  Problems  Med Hx  Surg Hx  Fam Hx         Objective     /80 (BP Location: Left arm, Patient Position: Sitting, Cuff Size: Standard)   Pulse 88   Temp 97.5 °F (36.4 °C)   Ht 5' 4.17\" (1.63 m)   Wt 63 kg (139 lb)   LMP 06/19/2024   SpO2 99%   BMI 23.73 kg/m²     Physical Exam  Vitals and nursing note reviewed.   Constitutional:       General: She is not in acute distress.     Appearance: Normal appearance. She is well-developed and normal weight. She is not ill-appearing or toxic-appearing.   HENT:      Head: " Normocephalic and atraumatic.      Right Ear: Tympanic membrane, ear canal and external ear normal. There is no impacted cerumen.      Left Ear: Tympanic membrane, ear canal and external ear normal. There is no impacted cerumen.      Nose: Nose normal.      Mouth/Throat:      Mouth: Mucous membranes are moist.      Pharynx: No oropharyngeal exudate or posterior oropharyngeal erythema.   Eyes:      General: No scleral icterus.        Left eye: No discharge.      Conjunctiva/sclera: Conjunctivae normal.      Pupils: Pupils are equal, round, and reactive to light.   Neck:      Thyroid: No thyroid mass, thyromegaly or thyroid tenderness.   Cardiovascular:      Rate and Rhythm: Normal rate and regular rhythm.      Heart sounds: Normal heart sounds. No murmur heard.  Pulmonary:      Effort: Pulmonary effort is normal. No respiratory distress.      Breath sounds: Normal breath sounds. No wheezing, rhonchi or rales.   Abdominal:      General: Abdomen is flat.      Palpations: Abdomen is soft. There is no mass.      Tenderness: There is no abdominal tenderness. There is no guarding.      Hernia: No hernia is present.   Musculoskeletal:         General: No swelling.      Cervical back: Neck supple. No muscular tenderness.        Feet:    Lymphadenopathy:      Cervical: No cervical adenopathy.   Skin:     General: Skin is warm and dry.      Capillary Refill: Capillary refill takes less than 2 seconds.      Findings: No rash.   Neurological:      Mental Status: She is alert and oriented to person, place, and time.   Psychiatric:         Mood and Affect: Mood normal.         Behavior: Behavior normal.         Thought Content: Thought content normal.         Judgment: Judgment normal.

## 2024-06-19 NOTE — ASSESSMENT & PLAN NOTE
C/w healthy diet and exercise; proud of patient for quitting vaping; UTD on pap; has dental home; reviewed preventative measures

## 2024-06-19 NOTE — ASSESSMENT & PLAN NOTE
Currently well controlled; uses about once a month currently; c/w as needed albuterol; f/u guidance given

## 2024-07-19 PROBLEM — Z00.00 ROUTINE ADULT HEALTH MAINTENANCE: Status: RESOLVED | Noted: 2023-06-14 | Resolved: 2024-07-19

## 2024-09-07 DIAGNOSIS — Z30.41 ENCOUNTER FOR SURVEILLANCE OF CONTRACEPTIVE PILLS: ICD-10-CM

## 2024-09-07 RX ORDER — NORGESTIMATE AND ETHINYL ESTRADIOL 7DAYSX3 28
1 KIT ORAL DAILY
Qty: 28 TABLET | Refills: 2 | Status: SHIPPED | OUTPATIENT
Start: 2024-09-07

## 2024-09-11 ENCOUNTER — APPOINTMENT (OUTPATIENT)
Dept: LAB | Facility: CLINIC | Age: 24
End: 2024-09-11
Payer: COMMERCIAL

## 2024-09-11 DIAGNOSIS — Z13.6 SCREENING FOR CARDIOVASCULAR CONDITION: ICD-10-CM

## 2024-09-11 DIAGNOSIS — E87.1 HYPONATREMIA: ICD-10-CM

## 2024-09-11 DIAGNOSIS — Z00.8 HEALTH EXAMINATION IN POPULATION SURVEY: ICD-10-CM

## 2024-09-11 DIAGNOSIS — R10.9 ABDOMINAL PAIN: ICD-10-CM

## 2024-09-11 DIAGNOSIS — K59.00 CONSTIPATION: ICD-10-CM

## 2024-09-11 DIAGNOSIS — L50.2 URTICARIA DUE TO COLD: ICD-10-CM

## 2024-09-11 LAB
ALBUMIN SERPL BCG-MCNC: 4.3 G/DL (ref 3.5–5)
ALP SERPL-CCNC: 49 U/L (ref 34–104)
ALT SERPL W P-5'-P-CCNC: 17 U/L (ref 7–52)
ANA SER QL IA: NEGATIVE
ANION GAP SERPL CALCULATED.3IONS-SCNC: 11 MMOL/L (ref 4–13)
AST SERPL W P-5'-P-CCNC: 20 U/L (ref 13–39)
BASOPHILS # BLD AUTO: 0.05 THOUSANDS/ΜL (ref 0–0.1)
BASOPHILS NFR BLD AUTO: 1 % (ref 0–1)
BILIRUB SERPL-MCNC: 0.63 MG/DL (ref 0.2–1)
BUN SERPL-MCNC: 15 MG/DL (ref 5–25)
CALCIUM ALBUM COR SERPL-MCNC: 9.7 MG/DL (ref 8.3–10.1)
CALCIUM SERPL-MCNC: 9.9 MG/DL (ref 8.4–10.2)
CHLORIDE SERPL-SCNC: 105 MMOL/L (ref 96–108)
CHOLEST SERPL-MCNC: 196 MG/DL
CHOLEST SERPL-MCNC: 199 MG/DL
CO2 SERPL-SCNC: 26 MMOL/L (ref 21–32)
CREAT SERPL-MCNC: 0.74 MG/DL (ref 0.6–1.3)
CRP SERPL QL: 2 MG/L
EOSINOPHIL # BLD AUTO: 0.61 THOUSAND/ΜL (ref 0–0.61)
EOSINOPHIL NFR BLD AUTO: 11 % (ref 0–6)
ERYTHROCYTE [DISTWIDTH] IN BLOOD BY AUTOMATED COUNT: 12.8 % (ref 11.6–15.1)
ERYTHROCYTE [SEDIMENTATION RATE] IN BLOOD: 10 MM/HOUR (ref 0–19)
EST. AVERAGE GLUCOSE BLD GHB EST-MCNC: 100 MG/DL
GFR SERPL CREATININE-BSD FRML MDRD: 113 ML/MIN/1.73SQ M
GLUCOSE P FAST SERPL-MCNC: 83 MG/DL (ref 65–99)
HBA1C MFR BLD: 5.1 %
HCT VFR BLD AUTO: 42.5 % (ref 34.8–46.1)
HDLC SERPL-MCNC: 61 MG/DL
HDLC SERPL-MCNC: 62 MG/DL
HGB BLD-MCNC: 13.8 G/DL (ref 11.5–15.4)
IMM GRANULOCYTES # BLD AUTO: 0.01 THOUSAND/UL (ref 0–0.2)
IMM GRANULOCYTES NFR BLD AUTO: 0 % (ref 0–2)
LDLC SERPL CALC-MCNC: 124 MG/DL (ref 0–100)
LDLC SERPL CALC-MCNC: 126 MG/DL (ref 0–100)
LYMPHOCYTES # BLD AUTO: 1.87 THOUSANDS/ΜL (ref 0.6–4.47)
LYMPHOCYTES NFR BLD AUTO: 34 % (ref 14–44)
MCH RBC QN AUTO: 29.3 PG (ref 26.8–34.3)
MCHC RBC AUTO-ENTMCNC: 32.5 G/DL (ref 31.4–37.4)
MCV RBC AUTO: 90 FL (ref 82–98)
MONOCYTES # BLD AUTO: 0.58 THOUSAND/ΜL (ref 0.17–1.22)
MONOCYTES NFR BLD AUTO: 11 % (ref 4–12)
NEUTROPHILS # BLD AUTO: 2.32 THOUSANDS/ΜL (ref 1.85–7.62)
NEUTS SEG NFR BLD AUTO: 43 % (ref 43–75)
NONHDLC SERPL-MCNC: 137 MG/DL
NRBC BLD AUTO-RTO: 0 /100 WBCS
PLATELET # BLD AUTO: 274 THOUSANDS/UL (ref 149–390)
PMV BLD AUTO: 10.9 FL (ref 8.9–12.7)
POTASSIUM SERPL-SCNC: 4.6 MMOL/L (ref 3.5–5.3)
PROT SERPL-MCNC: 7.8 G/DL (ref 6.4–8.4)
RBC # BLD AUTO: 4.71 MILLION/UL (ref 3.81–5.12)
SODIUM SERPL-SCNC: 142 MMOL/L (ref 135–147)
TRIGL SERPL-MCNC: 54 MG/DL
TRIGL SERPL-MCNC: 55 MG/DL
TSH SERPL DL<=0.05 MIU/L-ACNC: 1.71 UIU/ML (ref 0.45–4.5)
WBC # BLD AUTO: 5.44 THOUSAND/UL (ref 4.31–10.16)

## 2024-09-11 PROCEDURE — 80061 LIPID PANEL: CPT

## 2024-09-11 PROCEDURE — 86258 DGP ANTIBODY EACH IG CLASS: CPT

## 2024-09-11 PROCEDURE — 86364 TISS TRNSGLTMNASE EA IG CLAS: CPT

## 2024-09-11 PROCEDURE — 84443 ASSAY THYROID STIM HORMONE: CPT

## 2024-09-11 PROCEDURE — 36415 COLL VENOUS BLD VENIPUNCTURE: CPT

## 2024-09-11 PROCEDURE — 86140 C-REACTIVE PROTEIN: CPT

## 2024-09-11 PROCEDURE — 86038 ANTINUCLEAR ANTIBODIES: CPT

## 2024-09-11 PROCEDURE — 85025 COMPLETE CBC W/AUTO DIFF WBC: CPT

## 2024-09-11 PROCEDURE — 80053 COMPREHEN METABOLIC PANEL: CPT

## 2024-09-11 PROCEDURE — 83036 HEMOGLOBIN GLYCOSYLATED A1C: CPT

## 2024-09-11 PROCEDURE — 82784 ASSAY IGA/IGD/IGG/IGM EACH: CPT

## 2024-09-11 PROCEDURE — 86231 EMA EACH IG CLASS: CPT

## 2024-09-11 PROCEDURE — 85652 RBC SED RATE AUTOMATED: CPT

## 2024-09-12 LAB
ENDOMYSIUM IGA SER QL: NEGATIVE
GLIADIN PEPTIDE IGA SER-ACNC: 5 UNITS (ref 0–19)
GLIADIN PEPTIDE IGG SER-ACNC: 3 UNITS (ref 0–19)
IGA SERPL-MCNC: 330 MG/DL (ref 87–352)
TTG IGA SER-ACNC: <2 U/ML (ref 0–3)
TTG IGG SER-ACNC: 3 U/ML (ref 0–5)

## 2024-09-27 ENCOUNTER — OFFICE VISIT (OUTPATIENT)
Dept: GASTROENTEROLOGY | Facility: CLINIC | Age: 24
End: 2024-09-27
Payer: COMMERCIAL

## 2024-09-27 VITALS — TEMPERATURE: 98 F | HEIGHT: 63 IN | BODY MASS INDEX: 23.92 KG/M2 | WEIGHT: 135 LBS

## 2024-09-27 DIAGNOSIS — K59.09 CHRONIC CONSTIPATION: Primary | ICD-10-CM

## 2024-09-27 PROCEDURE — 99214 OFFICE O/P EST MOD 30 MIN: CPT | Performed by: PHYSICIAN ASSISTANT

## 2024-09-27 NOTE — PROGRESS NOTES
[facility] - Outpatient Follow-up Note  Dolores Allan 23 y.o. female MRN: [id number]  Encounter: [id number]          ASSESSMENT AND PLAN:      Dolores is a 22 y/o female who presents for follow-up.       1. Chronic Constipation  TSH, celiac panel WNL. She says that since starting miralax, she has not had any further abdominal pain. She says she moves her bowels every day-every other day with taking miralax 1-2 times/week. She is very pleased at this time.   -continue miralax PRN constipation     _________________________________________________    SUBJECTIVE:  She says that since starting miralax, she has not had any further abdominal pain. She says she moves her bowels every day-every other day with taking miralax 1-2 times/week. She denies n/v, heartburn, trouble swallowing, diarrhea, NSAID use, unintentional weight loss, fevers, chills, night sweats, bloody or black BM.       REVIEW OF SYSTEMS IS OTHERWISE NEGATIVE.      Historical Information   Past Medical History:   Diagnosis Date    Anxiety     Depression      Past Surgical History:   Procedure Laterality Date    TYMPANOSTOMY TUBE PLACEMENT Bilateral     WISDOM TOOTH EXTRACTION Bilateral      Social History   Social History     Substance and Sexual Activity   Alcohol Use Never     Social History     Substance and Sexual Activity   Drug Use Never     Social History     Tobacco Use   Smoking Status Never   Smokeless Tobacco Never     Family History   Problem Relation Age of Onset    Coronary artery disease Father        Meds/Allergies       Current Outpatient Medications:     albuterol (PROVENTIL HFA,VENTOLIN HFA) 90 mcg/act inhaler    famotidine (PEPCID) 20 mg tablet    ketoconazole (NIZORAL) 2 % cream    ketoconazole (NIZORAL) 2 % shampoo    norgestimate-ethinyl estradiol (Tri-Sprintec) 0.18/0.215/0.25 MG-35 MCG per tablet    Allergies   Allergen Reactions    Penicillin G Other (See Comments)           Objective     There  were no vitals taken for this visit. There is no height or weight on file to calculate BMI.      PHYSICAL EXAM:      General Appearance:   Alert, cooperative, no distress   HEENT:   Normocephalic, atraumatic, anicteric.     Neck:  Supple, symmetrical, trachea midline   Lungs:   Clear to auscultation bilaterally; no rales, rhonchi or wheezing; respirations unlabored    Heart::   Regular rate and rhythm; no murmur, rub, or gallop.   Abdomen:   Soft, non-tender, non-distended; normal bowel sounds; no masses, no organomegaly    Genitalia:   Deferred    Rectal:   Deferred    Extremities:  No cyanosis, clubbing or edema    Pulses:  2+ and symmetric    Skin:  No jaundice, rashes, or lesions    Lymph nodes:  No palpable cervical lymphadenopathy        Lab Results:   No visits with results within 1 Day(s) from this visit.   Latest known visit with results is:   Appointment on 09/11/2024   Component Date Value    Hemoglobin A1C 09/11/2024 5.1     EAG 09/11/2024 100     Cholesterol 09/11/2024 199     Triglycerides 09/11/2024 54     HDL, Direct 09/11/2024 62     LDL Calculated 09/11/2024 126 (H)     Non-HDL-Chol (CHOL-HDL) 09/11/2024 137          Radiology Results:   No results found.

## 2024-10-03 ENCOUNTER — ANNUAL EXAM (OUTPATIENT)
Dept: OBGYN CLINIC | Facility: CLINIC | Age: 24
End: 2024-10-03
Payer: COMMERCIAL

## 2024-10-03 VITALS
HEIGHT: 63 IN | WEIGHT: 137.6 LBS | SYSTOLIC BLOOD PRESSURE: 112 MMHG | DIASTOLIC BLOOD PRESSURE: 70 MMHG | BODY MASS INDEX: 24.38 KG/M2

## 2024-10-03 DIAGNOSIS — Z30.41 ENCOUNTER FOR SURVEILLANCE OF CONTRACEPTIVE PILLS: ICD-10-CM

## 2024-10-03 DIAGNOSIS — Z01.419 ENCOUNTER FOR ANNUAL ROUTINE GYNECOLOGICAL EXAMINATION: Primary | ICD-10-CM

## 2024-10-03 PROCEDURE — S0612 ANNUAL GYNECOLOGICAL EXAMINA: HCPCS | Performed by: OBSTETRICS & GYNECOLOGY

## 2024-10-03 RX ORDER — MULTIVITAMIN
1 TABLET ORAL DAILY
COMMUNITY

## 2024-10-03 RX ORDER — NORGESTIMATE AND ETHINYL ESTRADIOL 7DAYSX3 28
1 KIT ORAL DAILY
Qty: 84 TABLET | Refills: 4 | Status: SHIPPED | OUTPATIENT
Start: 2024-10-03

## 2024-10-03 NOTE — ASSESSMENT & PLAN NOTE
Orders:    norgestimate-ethinyl estradiol (Tri-Sprintec) 0.18/0.215/0.25 MG-35 MCG per tablet; Take 1 tablet by mouth daily

## 2024-10-03 NOTE — PROGRESS NOTES
Ambulatory Visit  Name: Dolores Allan      : 2000      MRN: [id number]  Encounter Provider: Gavi Recio DO  Encounter Date: 10/3/2024   Encounter department: [facility]    Assessment & Plan  Encounter for annual routine gynecological examination         Encounter for surveillance of contraceptive pills    Orders:    norgestimate-ethinyl estradiol (Tri-Sprintec) 0.18/0.215/0.25 MG-35 MCG per tablet; Take 1 tablet by mouth daily    Pap smear deferred due to low risk status.  Encouraged self breast examination as well as calcium supplementation.  Continue Tri-Sprintec (dispense as written) x 1 year.  Return to office in 1 year or as needed    History of Present Illness       Dolores Allan is a 23 y.o. female who presents     This is a pleasant 23-year-old female G0 who presents for her GYN exam.  She has been on Tri-Sprintec since eighth grade for cycle control.  Her cycles are regular every 28 days lasting 5 days with no breakthrough bleeding.  She denies any changes in bowel or bladder function.  She is sexually active, monogamous relationship for 2 years.  She did receive the Gardasil vaccine in .  Pap smears have been normal.  Last Pap .    Employed [employer], athletic training, to [employer], lives with boyfriend who also his     History obtained from : patient  Review of Systems   Constitutional:  Negative for fatigue, fever and unexpected weight change.   Respiratory:  Negative for cough, chest tightness, shortness of breath and wheezing.    Cardiovascular: Negative.  Negative for chest pain and palpitations.   Gastrointestinal: Negative.  Negative for abdominal distention, abdominal pain, blood in stool, constipation, diarrhea, nausea and vomiting.   Genitourinary: Negative.  Negative for difficulty urinating, dyspareunia, dysuria, flank pain, frequency, genital sores, hematuria, pelvic pain, urgency, vaginal bleeding, vaginal  "discharge and vaginal pain.   Skin:  Negative for rash.     Current Outpatient Medications on File Prior to Visit   Medication Sig Dispense Refill    albuterol (PROVENTIL HFA,VENTOLIN HFA) 90 mcg/act inhaler Inhale 2 puffs every 4 (four) hours as needed for wheezing 8.5 g 0    ketoconazole (NIZORAL) 2 % cream Apply topically daily 30 g 0    ketoconazole (NIZORAL) 2 % shampoo Apply 1 Application topically 2 (two) times a week 120 mL 1    Multiple Vitamin (multivitamin) tablet Take 1 tablet by mouth daily      [DISCONTINUED] norgestimate-ethinyl estradiol (Tri-Sprintec) 0.18/0.215/0.25 MG-35 MCG per tablet TAKE 1 TABLET BY MOUTH EVERY DAY 28 tablet 2    famotidine (PEPCID) 20 mg tablet Take 1 tablet (20 mg total) by mouth 2 (two) times a day 60 tablet 0     No current facility-administered medications on file prior to visit.          Objective     /70   Ht 5' 3\" (1.6 m)   Wt 62.4 kg (137 lb 9.6 oz)   LMP 09/18/2024 (Approximate)   BMI 24.37 kg/m²     Physical Exam  Constitutional:       Appearance: Normal appearance. She is well-developed.   HENT:      Head: Normocephalic and atraumatic.   Cardiovascular:      Rate and Rhythm: Normal rate and regular rhythm.   Pulmonary:      Effort: Pulmonary effort is normal.      Breath sounds: Normal breath sounds.   Chest:   Breasts:     Right: No inverted nipple, mass, nipple discharge, skin change or tenderness.      Left: No inverted nipple, mass, nipple discharge, skin change or tenderness.   Abdominal:      General: Bowel sounds are normal. There is no distension.      Palpations: Abdomen is soft.      Tenderness: There is no abdominal tenderness. There is no guarding or rebound.   Genitourinary:     Labia:         Right: No rash, tenderness or lesion.         Left: No rash, tenderness or lesion.       Vagina: Normal. No signs of injury. No vaginal discharge or tenderness.      Cervix: No cervical motion tenderness, discharge, friability, lesion or cervical " bleeding.      Uterus: Not enlarged and not tender.       Adnexa:         Right: No mass, tenderness or fullness.          Left: No mass, tenderness or fullness.     Neurological:      Mental Status: She is alert.   Psychiatric:         Behavior: Behavior normal.       Administrative Statements   I have spent a total time of 24 minutes in caring for this patient on the day of the visit/encounter including Impressions, Counseling / Coordination of care, Documenting in the medical record, Reviewing / ordering tests, medicine, procedures  , and Obtaining or reviewing history  .

## 2024-11-30 DIAGNOSIS — J45.909 MILD REACTIVE AIRWAYS DISEASE, UNSPECIFIED WHETHER PERSISTENT: ICD-10-CM

## 2024-12-02 RX ORDER — ALBUTEROL SULFATE 90 UG/1
INHALANT RESPIRATORY (INHALATION)
Qty: 6.7 G | Refills: 1 | Status: SHIPPED | OUTPATIENT
Start: 2024-12-02

## 2025-02-17 ENCOUNTER — TELEPHONE (OUTPATIENT)
Dept: FAMILY MEDICINE CLINIC | Facility: CLINIC | Age: 25
End: 2025-02-17

## 2025-02-17 NOTE — TELEPHONE ENCOUNTER
Pt notified that Dr. Mendoza is no longer with [facility] and will call back to schedule with new provider.

## 2025-03-14 ENCOUNTER — OFFICE VISIT (OUTPATIENT)
Dept: URGENT CARE | Facility: CLINIC | Age: 25
End: 2025-03-14
Payer: COMMERCIAL

## 2025-03-14 ENCOUNTER — NURSE TRIAGE (OUTPATIENT)
Age: 25
End: 2025-03-14

## 2025-03-14 ENCOUNTER — PATIENT MESSAGE (OUTPATIENT)
Dept: OBGYN CLINIC | Facility: CLINIC | Age: 25
End: 2025-03-14

## 2025-03-14 VITALS
DIASTOLIC BLOOD PRESSURE: 74 MMHG | TEMPERATURE: 98 F | OXYGEN SATURATION: 99 % | RESPIRATION RATE: 18 BRPM | HEART RATE: 69 BPM | SYSTOLIC BLOOD PRESSURE: 112 MMHG

## 2025-03-14 DIAGNOSIS — N30.01 ACUTE CYSTITIS WITH HEMATURIA: Primary | ICD-10-CM

## 2025-03-14 LAB
SL AMB  POCT GLUCOSE, UA: ABNORMAL
SL AMB LEUKOCYTE ESTERASE,UA: ABNORMAL
SL AMB POCT BILIRUBIN,UA: ABNORMAL
SL AMB POCT BLOOD,UA: ABNORMAL
SL AMB POCT CLARITY,UA: CLEAR
SL AMB POCT COLOR,UA: ABNORMAL
SL AMB POCT KETONES,UA: ABNORMAL
SL AMB POCT NITRITE,UA: ABNORMAL
SL AMB POCT PH,UA: 5
SL AMB POCT SPECIFIC GRAVITY,UA: 2.02
SL AMB POCT URINE PROTEIN: ABNORMAL
SL AMB POCT UROBILINOGEN: 1

## 2025-03-14 PROCEDURE — 87086 URINE CULTURE/COLONY COUNT: CPT

## 2025-03-14 PROCEDURE — 81002 URINALYSIS NONAUTO W/O SCOPE: CPT

## 2025-03-14 PROCEDURE — 87077 CULTURE AEROBIC IDENTIFY: CPT

## 2025-03-14 PROCEDURE — 99213 OFFICE O/P EST LOW 20 MIN: CPT

## 2025-03-14 PROCEDURE — 87186 SC STD MICRODIL/AGAR DIL: CPT

## 2025-03-14 RX ORDER — NITROFURANTOIN 25; 75 MG/1; MG/1
100 CAPSULE ORAL 2 TIMES DAILY
Qty: 10 CAPSULE | Refills: 0 | Status: SHIPPED | OUTPATIENT
Start: 2025-03-14 | End: 2025-03-19

## 2025-03-14 RX ORDER — PHENAZOPYRIDINE HYDROCHLORIDE 200 MG/1
200 TABLET, FILM COATED ORAL
Qty: 10 TABLET | Refills: 0 | Status: SHIPPED | OUTPATIENT
Start: 2025-03-14

## 2025-03-14 NOTE — TELEPHONE ENCOUNTER
"FOLLOW UP: Go to urgent care    REASON FOR CONVERSATION: Possible UTI    SYMPTOMS: UTI symptoms -   Cloudy urine, burning, sweet odor to urine, feels like she not fully emptying bladder, increased frequency.  Denies fever, body aches, chills, back/flank pain, hematuria    OTHER: patient is out of town, review options for OP UA/CS vs. UC appointment. She will go to UC for eval and treat this morning.    DISPOSITION: Go to Urgent Care Now (overriding See Today in Office)      Reason for Disposition   Urinating more frequently than usual (i.e., frequency) OR new-onset of the feeling of an urgent need to urinate (i.e., urgency)    Answer Assessment - Initial Assessment Questions  1. SYMPTOM: \"What's the main symptom you're concerned about?\" (e.g., frequency, incontinence)      Cloudy urine, burning, sweet odor to urine, feels like she not fully emptying bladder, increased frequency  2. ONSET: \"When did the  s/s  start?\"      Last night  3. PAIN: \"Is there any pain?\" If Yes, ask: \"How bad is it?\" (Scale: 1-10; mild, moderate, severe)      denies  4. CAUSE: \"What do you think is causing the symptoms?\"      Possible UTI  5. OTHER SYMPTOMS: \"Do you have any other symptoms?\" (e.g., blood in urine, fever, flank pain, pain with urination)      Denies fever, body aches, chills, back/flank pain, hematuria  6. PREGNANCY: \"Is there any chance you are pregnant?\" \"When was your last menstrual period?\"      aydee    Protocols used: Urinary Symptoms-Adult-[place]    "

## 2025-03-14 NOTE — TELEPHONE ENCOUNTER
Pt. Sent a myc message that she thinks she has a UTI. She is having cloudy urine, burning, pressure and also a sweet smell.

## 2025-03-14 NOTE — PROGRESS NOTES
[facility]        NAME: Dolores Allan is a 24 y.o. female  : 2000    MRN: [id number]  DATE: 2025  TIME: 12:48 PM    Assessment and Plan   Acute cystitis with hematuria [N30.01]  1. Acute cystitis with hematuria  POCT urine dip    Urine culture    Urine culture    nitrofurantoin (MACROBID) 100 mg capsule    phenazopyridine (PYRIDIUM) 200 mg tablet        Placed patient on Macrobid twice daily x 5 days and Pyridium 3 times daily as needed.  Advised on side effects of Pyridium if she begins to take the medication.  Patient encouraged to increase oral intake of liquids.  If any worsening symptoms such as fever, chills, flank pain, pain in or around the kidneys she was advised to seek care with the emergency department for further evaluation and treatment.  Otherwise, if failing to improve in approximately 3 to 5 days and not worsening patient was advised to follow-up with family doctor for a recheck.  Patient verbalizes understanding and agrees with this plan of care.  Patient was advised a urine culture will be sent for further analysis and staff will call her if we need to make any changes to her discussed treatment plan.  If she does not receive a call she was instructed to take antibiotics twice daily as directed until completion of the full course.    Patient Instructions       Follow up with PCP in 3-5 days.  Proceed to  ER if symptoms worsen.    If tests are performed, our office will contact you with results only if changes need to made to the care plan discussed with you at the visit. You can review your full results on [facility].    Chief Complaint     Chief Complaint   Patient presents with    Possible UTI     Started last evening with cloudy urine with sweet odor, later became painful with feeling of not fully emptying.  Also feeling crampy feeling. Have not taken anything for symptoms.         History of Present Illness       24 year old female patient with past  medical history significant for anxiety and mild reactive airway disease presents to the clinic with complaint of possible UTI.  Patient reports she started last evening with cloudy urine with sweet odor.  Later, urination became painful with the feeling of not fully emptying her bladder.  Patient also feels crampy in the abdomen.  She has not taken anything for her symptoms.        Review of Systems   Review of Systems   Genitourinary:  Positive for dysuria.        Bladder pressure/fullness  Cloudy urine  Odiferous urine         Current Medications       Current Outpatient Medications:     albuterol (PROVENTIL HFA,VENTOLIN HFA) 90 mcg/act inhaler, INHALE 2 PUFFS BY MOUTH EVERY 6 HOURS AS NEEDED FOR WHEEZE, Disp: 6.7 g, Rfl: 1    ketoconazole (NIZORAL) 2 % shampoo, Apply 1 Application topically 2 (two) times a week, Disp: 120 mL, Rfl: 1    Multiple Vitamin (multivitamin) tablet, Take 1 tablet by mouth daily, Disp: , Rfl:     nitrofurantoin (MACROBID) 100 mg capsule, Take 1 capsule (100 mg total) by mouth 2 (two) times a day for 5 days, Disp: 10 capsule, Rfl: 0    norgestimate-ethinyl estradiol (Tri-Sprintec) 0.18/0.215/0.25 MG-35 MCG per tablet, Take 1 tablet by mouth daily, Disp: 84 tablet, Rfl: 4    phenazopyridine (PYRIDIUM) 200 mg tablet, Take 1 tablet (200 mg total) by mouth 3 (three) times a day with meals, Disp: 10 tablet, Rfl: 0    famotidine (PEPCID) 20 mg tablet, Take 1 tablet (20 mg total) by mouth 2 (two) times a day, Disp: 60 tablet, Rfl: 0    ketoconazole (NIZORAL) 2 % cream, Apply topically daily (Patient not taking: Reported on 3/14/2025), Disp: 30 g, Rfl: 0    Current Allergies     Allergies as of 03/14/2025 - Reviewed 03/14/2025   Allergen Reaction Noted    Penicillin g Other (See Comments) 01/28/2021            The following portions of the patient's history were reviewed and updated as appropriate: allergies, current medications, past family history, past medical history, past social history,  past surgical history and problem list.     Past Medical History:   Diagnosis Date    Anxiety     Depression        Past Surgical History:   Procedure Laterality Date    TYMPANOSTOMY TUBE PLACEMENT Bilateral     WISDOM TOOTH EXTRACTION Bilateral        Family History   Problem Relation Age of Onset    Coronary artery disease Father          Medications have been verified.        Objective   /74 (BP Location: Left arm)   Pulse 69   Temp 98 °F (36.7 °C) (Skin)   Resp 18   LMP 03/07/2025 (Approximate)   SpO2 99%        Physical Exam     Physical Exam  Vitals and nursing note reviewed.   Constitutional:       Appearance: Normal appearance. She is normal weight.   HENT:      Head: Normocephalic and atraumatic.   Eyes:      Extraocular Movements: Extraocular movements intact.      Conjunctiva/sclera: Conjunctivae normal.      Pupils: Pupils are equal, round, and reactive to light.   Cardiovascular:      Rate and Rhythm: Normal rate and regular rhythm.      Pulses: Normal pulses.      Heart sounds: Normal heart sounds.   Pulmonary:      Effort: Pulmonary effort is normal.      Breath sounds: Normal breath sounds.   Abdominal:      General: Abdomen is flat. Bowel sounds are normal. There is no distension.      Palpations: Abdomen is soft. There is no mass.      Tenderness: There is abdominal tenderness (Pressure with palpation) in the suprapubic area. There is no right CVA tenderness, left CVA tenderness, guarding or rebound.      Hernia: No hernia is present.   Musculoskeletal:         General: Normal range of motion.      Cervical back: Normal range of motion and neck supple.   Lymphadenopathy:      Cervical: No cervical adenopathy.   Skin:     General: Skin is warm and dry.      Capillary Refill: Capillary refill takes less than 2 seconds.   Neurological:      General: No focal deficit present.      Mental Status: She is alert and oriented to person, place, and time.

## 2025-03-14 NOTE — PATIENT INSTRUCTIONS
Take prescription antibiotic as prescribed as ordered and for the total number of days ordered. DO NOT stop taking this medicine even if you are feeling better.   Your urine sample was sent to our lab for culture. The office will contact you following the final results if we need to make a change to your current treatment plan. Otherwise, if you do not hear from us, continue the antibiotics as ordered at today's visit.   You may have been prescribed phenazopyridine (Pyridium) to take for relief of UTI symptoms such as pain, burning, irritation, and urinary frequency. It should only be taken for the first 48 hours of treatment. This drug causes orange/yellow discoloration of the urine, which is a normal side effect.   You should stay properly hydrated and frequently urinate to help flush out the infection  Warm compresses for abdominal discomfort  You may take over the counter Tylenol (Acetaminophen) and/or Motrin (Ibuprofen) as needed for pain and/or fever, as directed on packaging.   Follow up with your PCP in 3-5 days  Proceed to ER if your symptoms worsen  If you experience worsening abdominal/back pain, pain in the area of your kidneys, fever/chills, bloody urination you will need to go to the emergency room

## 2025-03-16 ENCOUNTER — RESULTS FOLLOW-UP (OUTPATIENT)
Dept: URGENT CARE | Facility: CLINIC | Age: 25
End: 2025-03-16

## 2025-03-16 LAB — BACTERIA UR CULT: ABNORMAL

## 2025-05-04 DIAGNOSIS — J45.909 MILD REACTIVE AIRWAYS DISEASE, UNSPECIFIED WHETHER PERSISTENT: ICD-10-CM

## 2025-05-05 RX ORDER — ALBUTEROL SULFATE 90 UG/1
2 INHALANT RESPIRATORY (INHALATION) EVERY 6 HOURS PRN
Refills: 1 | OUTPATIENT
Start: 2025-05-05

## 2025-05-13 ENCOUNTER — OFFICE VISIT (OUTPATIENT)
Dept: FAMILY MEDICINE CLINIC | Facility: CLINIC | Age: 25
End: 2025-05-13
Payer: COMMERCIAL

## 2025-05-13 ENCOUNTER — APPOINTMENT (OUTPATIENT)
Dept: LAB | Facility: CLINIC | Age: 25
End: 2025-05-13
Payer: COMMERCIAL

## 2025-05-13 VITALS
HEART RATE: 82 BPM | TEMPERATURE: 98.7 F | SYSTOLIC BLOOD PRESSURE: 118 MMHG | WEIGHT: 139 LBS | BODY MASS INDEX: 23.73 KG/M2 | DIASTOLIC BLOOD PRESSURE: 68 MMHG | HEIGHT: 64 IN | OXYGEN SATURATION: 99 %

## 2025-05-13 DIAGNOSIS — J45.909 MILD REACTIVE AIRWAYS DISEASE, UNSPECIFIED WHETHER PERSISTENT: ICD-10-CM

## 2025-05-13 DIAGNOSIS — Z23 ENCOUNTER FOR IMMUNIZATION: ICD-10-CM

## 2025-05-13 DIAGNOSIS — Z00.01 ENCOUNTER FOR WELL ADULT EXAM WITH ABNORMAL FINDINGS: Primary | ICD-10-CM

## 2025-05-13 DIAGNOSIS — J30.89 NON-SEASONAL ALLERGIC RHINITIS DUE TO OTHER ALLERGIC TRIGGER: ICD-10-CM

## 2025-05-13 PROCEDURE — 90677 PCV20 VACCINE IM: CPT

## 2025-05-13 PROCEDURE — 99214 OFFICE O/P EST MOD 30 MIN: CPT | Performed by: FAMILY MEDICINE

## 2025-05-13 PROCEDURE — 82785 ASSAY OF IGE: CPT

## 2025-05-13 PROCEDURE — 90471 IMMUNIZATION ADMIN: CPT

## 2025-05-13 PROCEDURE — 99395 PREV VISIT EST AGE 18-39: CPT | Performed by: FAMILY MEDICINE

## 2025-05-13 PROCEDURE — 36415 COLL VENOUS BLD VENIPUNCTURE: CPT

## 2025-05-13 PROCEDURE — 86008 ALLG SPEC IGE RECOMB EA: CPT

## 2025-05-13 PROCEDURE — 86003 ALLG SPEC IGE CRUDE XTRC EA: CPT

## 2025-05-13 RX ORDER — LORATADINE 10 MG/1
10 TABLET ORAL DAILY
Qty: 30 TABLET | Refills: 2 | Status: SHIPPED | OUTPATIENT
Start: 2025-05-13

## 2025-05-13 RX ORDER — ALBUTEROL SULFATE 90 UG/1
2 INHALANT RESPIRATORY (INHALATION) EVERY 6 HOURS PRN
Qty: 6.7 G | Refills: 1 | Status: SHIPPED | OUTPATIENT
Start: 2025-05-13

## 2025-05-13 NOTE — ASSESSMENT & PLAN NOTE
Chronic no recent exacerbation or hospitalization refill on the albuterol inhaler has been given to the patient recommend to use allergy test discussed with patient she agree      Orders:  •  albuterol (PROVENTIL HFA,VENTOLIN HFA) 90 mcg/act inhaler; Inhale 2 puffs every 6 (six) hours as needed for wheezing  •  Food Allergy Profile; Future  •  [place] Allergy Panel, Adult; Future

## 2025-05-13 NOTE — PATIENT INSTRUCTIONS
"Patient Education     Routine physical for adults   The Basics   Written by the doctors and editors at [facility]   What is a physical? -- A physical is a routine visit, or \"check-up,\" with your doctor. You might also hear it called a \"wellness visit\" or \"preventive visit.\"  During each visit, the doctor will:   Ask about your physical and mental health   Ask about your habits, behaviors, and lifestyle   Do an exam   Give you vaccines if needed   Talk to you about any medicines you take   Give advice about your health   Answer your questions  Getting regular check-ups is an important part of taking care of your health. It can help your doctor find and treat any problems you have. But it's also important for preventing health problems.  A routine physical is different from a \"sick visit.\" A sick visit is when you see a doctor because of a health concern or problem. Since physicals are scheduled ahead of time, you can think about what you want to ask the doctor.  How often should I get a physical? -- It depends on your age and health. In general, for people age 21 years and older:   If you are younger than 50 years, you might be able to get a physical every 3 years.   If you are 50 years or older, your doctor might recommend a physical every year.  If you have an ongoing health condition, like diabetes or high blood pressure, your doctor will probably want to see you more often.  What happens during a physical? -- In general, each visit will include:   Physical exam - The doctor or nurse will check your height, weight, heart rate, and blood pressure. They will also look at your eyes and ears. They will ask about how you are feeling and whether you have any symptoms that bother you.   Medicines - It's a good idea to bring a list of all the medicines you take to each doctor visit. Your doctor will talk to you about your medicines and answer any questions. Tell them if you are having any side effects that bother you. You " "should also tell them if you are having trouble paying for any of your medicines.   Habits and behaviors - This includes:   Your diet   Your exercise habits   Whether you smoke, drink alcohol, or use drugs   Whether you are sexually active   Whether you feel safe at home  Your doctor will talk to you about things you can do to improve your health and lower your risk of health problems. They will also offer help and support. For example, if you want to quit smoking, they can give you advice and might prescribe medicines. If you want to improve your diet or get more physical activity, they can help you with this, too.   Lab tests, if needed - The tests you get will depend on your age and situation. For example, your doctor might want to check your:   Cholesterol   Blood sugar   Iron level   Vaccines - The recommended vaccines will depend on your age, health, and what vaccines you already had. Vaccines are very important because they can prevent certain serious or deadly infections.   Discussion of screening - \"Screening\" means checking for diseases or other health problems before they cause symptoms. Your doctor can recommend screening based on your age, risk, and preferences. This might include tests to check for:   Cancer, such as breast, prostate, cervical, ovarian, colorectal, prostate, lung, or skin cancer   Sexually transmitted infections, such as chlamydia and gonorrhea   Mental health conditions like depression and anxiety  Your doctor will talk to you about the different types of screening tests. They can help you decide which screenings to have. They can also explain what the results might mean.   Answering questions - The physical is a good time to ask the doctor or nurse questions about your health. If needed, they can refer you to other doctors or specialists, too.  Adults older than 65 years often need other care, too. As you get older, your doctor will talk to you about:   How to prevent falling at " home   Hearing or vision tests   Memory testing   How to take your medicines safely   Making sure that you have the help and support you need at home  All topics are updated as new evidence becomes available and our peer review process is complete.  This topic retrieved from [employer] on: May 02, 2024.  Topic 138035 Version 1.0  Release: 32.4.3 - C32.122  © 2024 UpToDate, Inc. and/or its affiliates. All rights reserved.  Consumer Information Use and Disclaimer   Disclaimer: This generalized information is a limited summary of diagnosis, treatment, and/or medication information. It is not meant to be comprehensive and should be used as a tool to help the user understand and/or assess potential diagnostic and treatment options. It does NOT include all information about conditions, treatments, medications, side effects, or risks that may apply to a specific patient. It is not intended to be medical advice or a substitute for the medical advice, diagnosis, or treatment of a health care provider based on the health care provider's examination and assessment of a patient's specific and unique circumstances. Patients must speak with a health care provider for complete information about their health, medical questions, and treatment options, including any risks or benefits regarding use of medications. This information does not endorse any treatments or medications as safe, effective, or approved for treating a specific patient. UpToDate, Inc. and its affiliates disclaim any warranty or liability relating to this information or the use thereof.The use of this information is governed by the Terms of Use, available at https://www.woltersAnhelouwer.com/en/know/clinical-effectiveness-terms. 2024© UpToDate, Inc. and its affiliates and/or licensors. All rights reserved.  Copyright   © 2024 UpToDate, Inc. and/or its affiliates. All rights reserved.    Patient Education     Routine physical for adults   The Basics   Written by the  "doctors and editors at [facility]   What is a physical? -- A physical is a routine visit, or \"check-up,\" with your doctor. You might also hear it called a \"wellness visit\" or \"preventive visit.\"  During each visit, the doctor will:   Ask about your physical and mental health   Ask about your habits, behaviors, and lifestyle   Do an exam   Give you vaccines if needed   Talk to you about any medicines you take   Give advice about your health   Answer your questions  Getting regular check-ups is an important part of taking care of your health. It can help your doctor find and treat any problems you have. But it's also important for preventing health problems.  A routine physical is different from a \"sick visit.\" A sick visit is when you see a doctor because of a health concern or problem. Since physicals are scheduled ahead of time, you can think about what you want to ask the doctor.  How often should I get a physical? -- It depends on your age and health. In general, for people age 21 years and older:   If you are younger than 50 years, you might be able to get a physical every 3 years.   If you are 50 years or older, your doctor might recommend a physical every year.  If you have an ongoing health condition, like diabetes or high blood pressure, your doctor will probably want to see you more often.  What happens during a physical? -- In general, each visit will include:   Physical exam - The doctor or nurse will check your height, weight, heart rate, and blood pressure. They will also look at your eyes and ears. They will ask about how you are feeling and whether you have any symptoms that bother you.   Medicines - It's a good idea to bring a list of all the medicines you take to each doctor visit. Your doctor will talk to you about your medicines and answer any questions. Tell them if you are having any side effects that bother you. You should also tell them if you are having trouble paying for any of your " "medicines.   Habits and behaviors - This includes:   Your diet   Your exercise habits   Whether you smoke, drink alcohol, or use drugs   Whether you are sexually active   Whether you feel safe at home  Your doctor will talk to you about things you can do to improve your health and lower your risk of health problems. They will also offer help and support. For example, if you want to quit smoking, they can give you advice and might prescribe medicines. If you want to improve your diet or get more physical activity, they can help you with this, too.   Lab tests, if needed - The tests you get will depend on your age and situation. For example, your doctor might want to check your:   Cholesterol   Blood sugar   Iron level   Vaccines - The recommended vaccines will depend on your age, health, and what vaccines you already had. Vaccines are very important because they can prevent certain serious or deadly infections.   Discussion of screening - \"Screening\" means checking for diseases or other health problems before they cause symptoms. Your doctor can recommend screening based on your age, risk, and preferences. This might include tests to check for:   Cancer, such as breast, prostate, cervical, ovarian, colorectal, prostate, lung, or skin cancer   Sexually transmitted infections, such as chlamydia and gonorrhea   Mental health conditions like depression and anxiety  Your doctor will talk to you about the different types of screening tests. They can help you decide which screenings to have. They can also explain what the results might mean.   Answering questions - The physical is a good time to ask the doctor or nurse questions about your health. If needed, they can refer you to other doctors or specialists, too.  Adults older than 65 years often need other care, too. As you get older, your doctor will talk to you about:   How to prevent falling at home   Hearing or vision tests   Memory testing   How to take your " medicines safely   Making sure that you have the help and support you need at home  All topics are updated as new evidence becomes available and our peer review process is complete.  This topic retrieved from [employer] on: May 02, 2024.  Topic 279354 Version 1.0  Release: 32.4.3 - C32.122  © 2024 UpToDate, Inc. and/or its affiliates. All rights reserved.  Consumer Information Use and Disclaimer   Disclaimer: This generalized information is a limited summary of diagnosis, treatment, and/or medication information. It is not meant to be comprehensive and should be used as a tool to help the user understand and/or assess potential diagnostic and treatment options. It does NOT include all information about conditions, treatments, medications, side effects, or risks that may apply to a specific patient. It is not intended to be medical advice or a substitute for the medical advice, diagnosis, or treatment of a health care provider based on the health care provider's examination and assessment of a patient's specific and unique circumstances. Patients must speak with a health care provider for complete information about their health, medical questions, and treatment options, including any risks or benefits regarding use of medications. This information does not endorse any treatments or medications as safe, effective, or approved for treating a specific patient. UpToDate, Inc. and its affiliates disclaim any warranty or liability relating to this information or the use thereof.The use of this information is governed by the Terms of Use, available at https://www.woltersPercolateuwer.com/en/know/clinical-effectiveness-terms. 2024© UpToDate, Inc. and its affiliates and/or licensors. All rights reserved.  Copyright   © 2024 UpToDate, Inc. and/or its affiliates. All rights reserved.

## 2025-05-13 NOTE — PROGRESS NOTES
Adult Annual Physical  Name: Dolores Allan      : 2000      MRN: [id number]  Encounter Provider: Gabriela Juarez MD  Encounter Date: 2025   Encounter department: [facility] PRIMARY [facility]    :  Assessment & Plan  Encounter for well adult exam with abnormal findings  Advice and education were given regarding nutrition, aerobic exercises, weight-bearing exercises, cardiovascular risk reduction, fall risk reduction, and age-appropriate supplements.     The patient was counseled regarding instructions for management, risk factor reductions, prognosis, risks and benefits of treatment options, patient and family education, and importance of compliance with treatment.  Patient follow-up with the GYN for her woman health       Mild reactive airways disease, unspecified whether persistent  Chronic no recent exacerbation or hospitalization refill on the albuterol inhaler has been given to the patient recommend to use allergy test discussed with patient she agree      Orders:  •  albuterol (PROVENTIL HFA,VENTOLIN HFA) 90 mcg/act inhaler; Inhale 2 puffs every 6 (six) hours as needed for wheezing  •  Food Allergy Profile; Future  •  [facility] Allergy Panel, Adult; Future    Non-seasonal allergic rhinitis due to other allergic trigger  New diagnosis symptomatic I discussed with patient start loratadine 10 mg once a day recommended to do allergy test respiratory and food allergy we will follow-up with the result accordingly  Orders:  •  loratadine (CLARITIN) 10 mg tablet; Take 1 tablet (10 mg total) by mouth daily  •  Food Allergy Profile; Future  •  [facility] Allergy Panel, Adult; Future    Encounter for immunization  Patient candidate for a Pneumovax 20 discussed and she agree  Orders:  •  Pneumococcal Conjugate Vaccine 20-valent (Pcv20)        Preventive Screenings:  - Diabetes Screening: screening up-to-date  - Hepatitis C screening: screening up-to-date   - HIV screening: screening up-to-date    - Cervical cancer screening: screening up-to-date   - Colon cancer screening: screening not indicated   - Lung cancer screening: screening not indicated     Immunizations:  - Immunizations due: Hepatitis A  - Risks/benefits immunizations discussed    - Immunizations given per orders      Counseling/Anticipatory Guidance:    - Diet: discussed recommendations for a healthy/well-balanced diet.   - Exercise: the importance of regular exercise/physical activity was discussed. Recommend exercise 3-5 times per week for at least 30 minutes.       Depression Screening and Follow-up Plan: Patient was screened for depression during today's encounter. They screened negative with a PHQ-2 score of 0.          History of Present Illness   Patient here for well exam and follow-up patient known to have history of reactive airway and usually when she gets sensitive to perfume or smell that she started cough and sneeze also she get congestion postnasal drip and runny nose no short of breath no rash past medical surgical family and social history review    Adult Annual Physical:  Patient presents for annual physical.     Diet and Physical Activity:  - Diet/Nutrition: low calorie diet.  - Exercise: moderate cardiovascular exercise, 5-7 times a week on average and 1-2 hours on average.    Depression Screening:  - PHQ-2 Score: 0    General Health:  - Sleep: sleeps well and > 8 hours of sleep on average.  - Hearing: normal hearing bilateral ears.  - Vision: no vision problems and most recent eye exam > 1 year ago.  - Dental: regular dental visits, floss regularly and brushes teeth three times daily.    /GYN Health:  - Follows with GYN: yes.   - Last menstrual cycle: 4/23/2025.   - History of STDs: no  - Contraception: oral contraceptives.      Advanced Care Planning:  - Has an advanced directive?: no    - Has a durable medical POA?: no      Review of Systems   Constitutional:  Negative for activity change, appetite change, fatigue and  "fever.   HENT:  Positive for congestion and postnasal drip. Negative for ear pain, rhinorrhea, sinus pressure, sinus pain and sore throat.    Eyes:  Negative for discharge and redness.   Respiratory:  Negative for cough, chest tightness and shortness of breath.    Cardiovascular:  Negative for chest pain, palpitations and leg swelling.   Gastrointestinal:  Negative for abdominal pain, constipation and diarrhea.   Genitourinary:  Negative for dysuria, flank pain, frequency and hematuria.   Musculoskeletal:  Negative for gait problem.   Skin:  Negative for pallor and rash.   Neurological:  Negative for dizziness, tremors, weakness, numbness and headaches.   Hematological:  Does not bruise/bleed easily.         Objective   /68 (BP Location: Left arm, Patient Position: Sitting, Cuff Size: Standard)   Pulse 82   Temp 98.7 °F (37.1 °C) (Tympanic)   Ht 5' 3.86\" (1.622 m)   Wt 63 kg (139 lb)   SpO2 99%   BMI 23.97 kg/m²     Physical Exam  Vitals and nursing note reviewed.   Constitutional:       General: She is not in acute distress.     Appearance: She is well-developed. She is not diaphoretic.   HENT:      Head: Normocephalic.      Right Ear: Tympanic membrane and external ear normal.      Left Ear: Tympanic membrane and external ear normal.      Nose: No rhinorrhea.      Mouth/Throat:      Pharynx: No posterior oropharyngeal erythema.     Eyes:      General:         Right eye: No discharge.         Left eye: No discharge.      Conjunctiva/sclera: Conjunctivae normal.     Neck:      Vascular: No JVD.     Cardiovascular:      Rate and Rhythm: Normal rate and regular rhythm.      Heart sounds: Normal heart sounds. No murmur heard.     No gallop.   Pulmonary:      Effort: Pulmonary effort is normal. No respiratory distress.      Breath sounds: Normal breath sounds. No wheezing.   Abdominal:      General: There is no distension.      Palpations: Abdomen is soft.      Tenderness: There is no abdominal tenderness. " There is no rebound.     Musculoskeletal:         General: No tenderness.      Cervical back: Normal range of motion and neck supple.   Lymphadenopathy:      Cervical: No cervical adenopathy.     Skin:     General: Skin is warm.      Findings: No rash.     Neurological:      Mental Status: She is alert and oriented to person, place, and time.

## 2025-05-14 LAB
A ALTERNATA IGE QN: <0.1 KUA/I (ref 0–0.1)
A FUMIGATUS IGE QN: <0.1 KUA/I (ref 0–0.1)
A-LACTALB IGE QN: <0.1 KAU/I (ref 0–0.1)
ALMOND IGE QN: 0.34 KUA/I (ref 0–0.1)
ARA H6 PEANUT: <0.1 KUA/I (ref 0–0.1)
B-LACTOGLOB IGE QN: <0.1 KAU/I (ref 0–0.1)
BERMUDA GRASS IGE QN: 0.39 KUA/I (ref 0–0.1)
BOXELDER IGE QN: 0.79 KUA/I (ref 0–0.1)
C HERBARUM IGE QN: <0.1 KUA/I (ref 0–0.1)
CASEIN IGE QN: <0.1 KAU/I (ref 0–0.1)
CASHEW NUT IGE QN: 0.52 KUA/I (ref 0–0.1)
CAT DANDER IGE QN: 64.3 KUA/I (ref 0–0.1)
CMN PIGWEED IGE QN: 0.22 KUA/I (ref 0–0.1)
CODFISH IGE QN: <0.1 KUA/I (ref 0–0.1)
COMMON RAGWEED IGE QN: 0.3 KUA/I (ref 0–0.1)
COTTONWOOD IGE QN: 0.63 KUA/I (ref 0–0.1)
D FARINAE IGE QN: 0.26 KUA/I (ref 0–0.1)
D PTERONYSS IGE QN: 0.29 KUA/I (ref 0–0.1)
DOG DANDER IGE QN: 14.7 KUA/I (ref 0–0.1)
EGG WHITE IGE QN: 0.62 KUA/I (ref 0–0.1)
GLUTEN IGE QN: <0.1 KUA/I (ref 0–0.1)
HAZELNUT IGE QN: 0.62 KUA/L (ref 0–0.1)
LONDON PLANE IGE QN: 0.51 KUA/I (ref 0–0.1)
MILK IGE QN: 0.17 KUA/I (ref 0–0.1)
MOUSE URINE PROT IGE QN: <0.1 KUA/I (ref 0–0.1)
MT JUNIPER IGE QN: 0.45 KUA/I (ref 0–0.1)
MUGWORT IGE QN: 0.17 KUA/I (ref 0–0.1)
OVALB IGE QN: 0.14 KAU/I (ref 0–0.1)
OVOMUCOID IGE QN: <0.1 KAU/I (ref 0–0.1)
P NOTATUM IGE QN: <0.1 KUA/I (ref 0–0.1)
PEANUT (RARA H) 1 IGE QN: <0.1 KUA/I (ref 0–0.1)
PEANUT (RARA H) 2 IGE QN: <0.1 KUA/I (ref 0–0.1)
PEANUT (RARA H) 3 IGE QN: 1.5 KUA/I (ref 0–0.1)
PEANUT (RARA H) 8 IGE QN: <0.1 KUA/I (ref 0–0.1)
PEANUT (RARA H) 9 IGE QN: <0.1 KUA/I (ref 0–0.1)
PEANUT IGE QN: 0.87 KUA/I (ref 0–0.1)
ROACH IGE QN: 0.19 KUA/I (ref 0–0.1)
SALMON IGE QN: <0.1 KUA/I (ref 0–0.1)
SCALLOP IGE QN: <0.1 KUA/L (ref 0–0.1)
SESAME SEED IGE QN: 0.63 KUA/I (ref 0–0.1)
SHEEP SORREL IGE QN: 0.1 KUA/I (ref 0–0.1)
SHRIMP IGE QN: 0.2 KUA/L (ref 0–0.1)
SILVER BIRCH IGE QN: 0.8 KUA/I (ref 0–0.1)
SOYBEAN IGE QN: 0.84 KUA/I (ref 0–0.1)
TIMOTHY IGE QN: 0.44 KUA/I (ref 0–0.1)
TOTAL IGE SMQN RAST: 1230 KU/L (ref 0–113)
TOTAL IGE SMQN RAST: 1270 KU/L (ref 0–113)
TUNA IGE QN: <0.1 KUA/I (ref 0–0.1)
WALNUT IGE QN: 0.2 KUA/I (ref 0–0.1)
WALNUT IGE QN: 0.38 KUA/I (ref 0–0.1)
WHEAT IGE QN: 0.29 KUA/I (ref 0–0.1)
WHITE ASH IGE QN: 0.33 KUA/I (ref 0–0.1)
WHITE ELM IGE QN: 0.91 KUA/I (ref 0–0.1)
WHITE MULBERRY IGE QN: 0.21 KUA/I (ref 0–0.1)
WHITE OAK IGE QN: 0.62 KUA/I (ref 0–0.1)

## 2025-05-15 ENCOUNTER — RESULTS FOLLOW-UP (OUTPATIENT)
Dept: FAMILY MEDICINE CLINIC | Facility: CLINIC | Age: 25
End: 2025-05-15

## 2025-05-15 DIAGNOSIS — Z88.9 MULTIPLE ALLERGIES: Primary | ICD-10-CM

## 2025-05-15 PROBLEM — J30.9 ALLERGIC RHINITIS DUE TO ALLERGEN: Status: ACTIVE | Noted: 2025-05-15

## 2025-05-15 PROBLEM — Z00.01 ENCOUNTER FOR WELL ADULT EXAM WITH ABNORMAL FINDINGS: Status: ACTIVE | Noted: 2025-05-15

## 2025-05-15 NOTE — RESULT ENCOUNTER NOTE
Result of allergy test   Normal respiratory and food allergy to share results with the patient  Recommendation to send EpiPen to the patient pharmacy and to use it for symptoms like swelling lips swelling tongue wheezing recommend to be evaluated by allergist

## 2025-05-15 NOTE — ASSESSMENT & PLAN NOTE
Advice and education were given regarding nutrition, aerobic exercises, weight-bearing exercises, cardiovascular risk reduction, fall risk reduction, and age-appropriate supplements.     The patient was counseled regarding instructions for management, risk factor reductions, prognosis, risks and benefits of treatment options, patient and family education, and importance of compliance with treatment.  Patient follow-up with the GYN for her woman health

## 2025-05-15 NOTE — ASSESSMENT & PLAN NOTE
New diagnosis symptomatic I discussed with patient start loratadine 10 mg once a day recommended to do allergy test respiratory and food allergy we will follow-up with the result accordingly  Orders:  •  loratadine (CLARITIN) 10 mg tablet; Take 1 tablet (10 mg total) by mouth daily  •  Food Allergy Profile; Future  •  [facility] Panel, Adult; Future

## 2025-05-16 RX ORDER — EPINEPHRINE 0.3 MG/.3ML
0.3 INJECTION SUBCUTANEOUS ONCE
Qty: 0.6 ML | Refills: 0 | Status: SHIPPED | OUTPATIENT
Start: 2025-05-16 | End: 2025-05-16

## 2025-05-16 NOTE — RESULT ENCOUNTER NOTE
Lm to let pt know about recent blood work results. Dr. Juarez did send in epi pen for her to keep incase of symptoms such as swollen lips and swollen tongue. Dr. Juarez does recommend following up with allergist as well.

## 2025-05-21 NOTE — RESULT ENCOUNTER NOTE
Lm to let pt know about allergy test results, epi pen was sent to pharmacy. [email or web address] Juarez does recommend allergist. Let us know if you'd like us to send referral anywhere

## 2025-06-14 PROBLEM — Z00.01 ENCOUNTER FOR WELL ADULT EXAM WITH ABNORMAL FINDINGS: Status: RESOLVED | Noted: 2025-05-15 | Resolved: 2025-06-14
